# Patient Record
Sex: MALE | Race: WHITE | Employment: OTHER | URBAN - METROPOLITAN AREA
[De-identification: names, ages, dates, MRNs, and addresses within clinical notes are randomized per-mention and may not be internally consistent; named-entity substitution may affect disease eponyms.]

---

## 2017-01-06 ENCOUNTER — HOSPITAL ENCOUNTER (INPATIENT)
Age: 72
LOS: 3 days | Discharge: NURSING, REHAB OR LONG-TERM CARE | DRG: 442 | End: 2017-01-12
Attending: EMERGENCY MEDICINE | Admitting: FAMILY MEDICINE
Payer: MEDICARE

## 2017-01-06 ENCOUNTER — APPOINTMENT (OUTPATIENT)
Dept: GENERAL RADIOLOGY | Age: 72
DRG: 442 | End: 2017-01-06
Attending: FAMILY MEDICINE
Payer: MEDICARE

## 2017-01-06 ENCOUNTER — APPOINTMENT (OUTPATIENT)
Dept: CT IMAGING | Age: 72
DRG: 442 | End: 2017-01-06
Attending: FAMILY MEDICINE
Payer: MEDICARE

## 2017-01-06 ENCOUNTER — APPOINTMENT (OUTPATIENT)
Dept: MRI IMAGING | Age: 72
DRG: 442 | End: 2017-01-06
Attending: INTERNAL MEDICINE
Payer: MEDICARE

## 2017-01-06 DIAGNOSIS — R29.898 RIGHT ARM WEAKNESS: Primary | ICD-10-CM
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
## 2017-01-06 PROBLEM — N18.30 CKD (CHRONIC KIDNEY DISEASE) STAGE 3, GFR 30-59 ML/MIN (HCC): Status: ACTIVE | Noted: 2017-01-06

## 2017-01-06 LAB
ALBUMIN SERPL BCP-MCNC: 3 G/DL (ref 3.5–5)
ALBUMIN/GLOB SERPL: 0.8 {RATIO} (ref 1.1–2.2)
ALP SERPL-CCNC: 83 U/L (ref 45–117)
ALT SERPL-CCNC: 32 U/L (ref 12–78)
AMMONIA PLAS-SCNC: 91 UMOL/L
ANION GAP BLD CALC-SCNC: 7 MMOL/L (ref 5–15)
APPEARANCE UR: CLEAR
AST SERPL W P-5'-P-CCNC: 38 U/L (ref 15–37)
BACTERIA URNS QL MICRO: NEGATIVE /HPF
BASOPHILS # BLD AUTO: 0.1 K/UL (ref 0–0.1)
BASOPHILS # BLD: 1 % (ref 0–1)
BILIRUB SERPL-MCNC: 2.2 MG/DL (ref 0.2–1)
BILIRUB UR QL: NEGATIVE
BUN SERPL-MCNC: 17 MG/DL (ref 6–20)
BUN/CREAT SERPL: 11 (ref 12–20)
CALCIUM SERPL-MCNC: 8.6 MG/DL (ref 8.5–10.1)
CHLORIDE SERPL-SCNC: 108 MMOL/L (ref 97–108)
CO2 SERPL-SCNC: 26 MMOL/L (ref 21–32)
COLOR UR: NORMAL
CREAT SERPL-MCNC: 1.5 MG/DL (ref 0.7–1.3)
EOSINOPHIL # BLD: 0.5 K/UL (ref 0–0.4)
EOSINOPHIL NFR BLD: 9 % (ref 0–7)
EPITH CASTS URNS QL MICRO: NORMAL /LPF
ERYTHROCYTE [DISTWIDTH] IN BLOOD BY AUTOMATED COUNT: 15 % (ref 11.5–14.5)
GLOBULIN SER CALC-MCNC: 4 G/DL (ref 2–4)
GLUCOSE BLD STRIP.AUTO-MCNC: 124 MG/DL (ref 65–100)
GLUCOSE BLD STRIP.AUTO-MCNC: 147 MG/DL (ref 65–100)
GLUCOSE SERPL-MCNC: 167 MG/DL (ref 65–100)
GLUCOSE UR STRIP.AUTO-MCNC: NEGATIVE MG/DL
HCT VFR BLD AUTO: 36.1 % (ref 36.6–50.3)
HGB BLD-MCNC: 12.1 G/DL (ref 12.1–17)
HGB UR QL STRIP: NEGATIVE
HYALINE CASTS URNS QL MICRO: NORMAL /LPF (ref 0–5)
KETONES UR QL STRIP.AUTO: NEGATIVE MG/DL
LEUKOCYTE ESTERASE UR QL STRIP.AUTO: NEGATIVE
LYMPHOCYTES # BLD AUTO: 24 % (ref 12–49)
LYMPHOCYTES # BLD: 1.2 K/UL (ref 0.8–3.5)
MAGNESIUM SERPL-MCNC: 1.8 MG/DL (ref 1.6–2.4)
MCH RBC QN AUTO: 32.3 PG (ref 26–34)
MCHC RBC AUTO-ENTMCNC: 33.5 G/DL (ref 30–36.5)
MCV RBC AUTO: 96.3 FL (ref 80–99)
MONOCYTES # BLD: 0.6 K/UL (ref 0–1)
MONOCYTES NFR BLD AUTO: 12 % (ref 5–13)
NEUTS SEG # BLD: 2.7 K/UL (ref 1.8–8)
NEUTS SEG NFR BLD AUTO: 54 % (ref 32–75)
NITRITE UR QL STRIP.AUTO: NEGATIVE
PH UR STRIP: 6.5 [PH] (ref 5–8)
PLATELET # BLD AUTO: 75 K/UL (ref 150–400)
POTASSIUM SERPL-SCNC: 4.9 MMOL/L (ref 3.5–5.1)
PROT SERPL-MCNC: 7 G/DL (ref 6.4–8.2)
PROT UR STRIP-MCNC: NEGATIVE MG/DL
RBC # BLD AUTO: 3.75 M/UL (ref 4.1–5.7)
RBC #/AREA URNS HPF: NORMAL /HPF (ref 0–5)
SERVICE CMNT-IMP: ABNORMAL
SERVICE CMNT-IMP: ABNORMAL
SODIUM SERPL-SCNC: 141 MMOL/L (ref 136–145)
SP GR UR REFRACTOMETRY: 1.01 (ref 1–1.03)
TROPONIN I SERPL-MCNC: <0.04 NG/ML
UA: UC IF INDICATED,UAUC: NORMAL
UROBILINOGEN UR QL STRIP.AUTO: 1 EU/DL (ref 0.2–1)
WBC # BLD AUTO: 5.1 K/UL (ref 4.1–11.1)
WBC URNS QL MICRO: NORMAL /HPF (ref 0–4)

## 2017-01-06 PROCEDURE — 70551 MRI BRAIN STEM W/O DYE: CPT

## 2017-01-06 PROCEDURE — 77030033269 HC SLV COMPR SCD KNE2 CARD -B

## 2017-01-06 PROCEDURE — 81001 URINALYSIS AUTO W/SCOPE: CPT | Performed by: FAMILY MEDICINE

## 2017-01-06 PROCEDURE — 84484 ASSAY OF TROPONIN QUANT: CPT | Performed by: FAMILY MEDICINE

## 2017-01-06 PROCEDURE — 83735 ASSAY OF MAGNESIUM: CPT | Performed by: FAMILY MEDICINE

## 2017-01-06 PROCEDURE — 99218 HC RM OBSERVATION: CPT

## 2017-01-06 PROCEDURE — 99285 EMERGENCY DEPT VISIT HI MDM: CPT

## 2017-01-06 PROCEDURE — 85025 COMPLETE CBC W/AUTO DIFF WBC: CPT | Performed by: FAMILY MEDICINE

## 2017-01-06 PROCEDURE — 82140 ASSAY OF AMMONIA: CPT | Performed by: FAMILY MEDICINE

## 2017-01-06 PROCEDURE — 80053 COMPREHEN METABOLIC PANEL: CPT | Performed by: FAMILY MEDICINE

## 2017-01-06 PROCEDURE — 71010 XR CHEST PORT: CPT

## 2017-01-06 PROCEDURE — 70450 CT HEAD/BRAIN W/O DYE: CPT

## 2017-01-06 PROCEDURE — 82962 GLUCOSE BLOOD TEST: CPT

## 2017-01-06 PROCEDURE — 36415 COLL VENOUS BLD VENIPUNCTURE: CPT | Performed by: FAMILY MEDICINE

## 2017-01-06 PROCEDURE — 93005 ELECTROCARDIOGRAM TRACING: CPT

## 2017-01-06 RX ORDER — ASPIRIN 81 MG/1
81 TABLET ORAL DAILY
COMMUNITY

## 2017-01-06 RX ORDER — SODIUM CHLORIDE 0.9 % (FLUSH) 0.9 %
5-10 SYRINGE (ML) INJECTION EVERY 8 HOURS
Status: DISCONTINUED | OUTPATIENT
Start: 2017-01-06 | End: 2017-01-12 | Stop reason: HOSPADM

## 2017-01-06 RX ORDER — MAGNESIUM SULFATE 100 %
4 CRYSTALS MISCELLANEOUS AS NEEDED
Status: DISCONTINUED | OUTPATIENT
Start: 2017-01-06 | End: 2017-01-12 | Stop reason: HOSPADM

## 2017-01-06 RX ORDER — GUAIFENESIN 100 MG/5ML
81 LIQUID (ML) ORAL DAILY
Status: DISCONTINUED | OUTPATIENT
Start: 2017-01-07 | End: 2017-01-07

## 2017-01-06 RX ORDER — DEXTROSE 50 % IN WATER (D50W) INTRAVENOUS SYRINGE
12.5-25 AS NEEDED
Status: DISCONTINUED | OUTPATIENT
Start: 2017-01-06 | End: 2017-01-12 | Stop reason: HOSPADM

## 2017-01-06 RX ORDER — INSULIN LISPRO 100 [IU]/ML
INJECTION, SOLUTION INTRAVENOUS; SUBCUTANEOUS
Status: DISCONTINUED | OUTPATIENT
Start: 2017-01-06 | End: 2017-01-12 | Stop reason: HOSPADM

## 2017-01-06 RX ORDER — PROPRANOLOL HYDROCHLORIDE 40 MG/1
40 TABLET ORAL 2 TIMES DAILY
COMMUNITY
End: 2017-01-12

## 2017-01-06 RX ORDER — SODIUM CHLORIDE 0.9 % (FLUSH) 0.9 %
5-10 SYRINGE (ML) INJECTION AS NEEDED
Status: DISCONTINUED | OUTPATIENT
Start: 2017-01-06 | End: 2017-01-12 | Stop reason: HOSPADM

## 2017-01-06 NOTE — ED TRIAGE NOTES
Right sided weakness, poor balance which started last Thursday. Today happened again at 4 PM while trying to pour milk on his cereal, right side became weak, unable to move right side and fell to floor. Has an abrasion on left elbow. Moving right side upon arrival but is weak.

## 2017-01-06 NOTE — IP AVS SNAPSHOT
[address] 
[phone] Patient: Lazarus Bell. MRN: [id number] [facility]:2/72/4371 You are allergic to the following Allergen Reactions Codeine Shortness of Breath Lipitor (Atorvastatin) Other (comments) Stiff neck Lisinopril Myalgia Oxycodone Other (comments) Cant walk cant breathe, need to use my c pap machine Pcn (Penicillins) Shortness of Breath Recent Documentation Height Weight BMI Smoking Status 1.759 m 107 kg 34.6 kg/m2 Never Smoker Emergency Contacts Name Discharge Info Relation Home Work Mobile [address] [facility] CAREGIVER [3] Spouse [3] [phone] Christine Wilson  Father [15] [phone] Deepali Locke  Sister [23] [phone] About your hospitalization You were admitted on:  January 6, 2017 You last received care in the:  [facility] [place] 5M1 MED SURG 1 You were discharged on:  January 12, 2017 Unit phone number:  [phone] Why you were hospitalized 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 Your diagnoses also included:  Vascular Dementia, Thrombocytopenia (Hcc), Benjamin On Cpap, 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), Hx-Tia (Transient Ischemic Attack), Essential Hypertension, Dm Type 2 (Diabetes Mellitus, Type 2) (Hcc), Depression, Ckd (Chronic Kidney Disease) Stage 3, Gfr 30-59 Ml/Min, Increased Ammonia Level, Cerebral Microvascular Disease, Right Sided Weakness Providers Seen During Your Hospitalizations Provider Role Specialty Primary office phone Anthony Brennan MD Attending Provider Emergency Medicine [phone] Kapil Jones MD Attending Provider [facility] [phone] Your Primary Care Physician (PCP) Primary Care Physician Office Phone Office Fax Miranda Ratliff [phone] Follow-up Information Follow up With Details Comments Contact Info Melissa Brito NP Schedule an appointment as soon as possible for a visit in 2 weeks Call for Neurology follow-up appt with NP post hospitalization of R sided weakness within 2-3 weeks-- Speak with Royal Fitzpatrick or Macario Chan and state you are a hospital follow-up. [address] [facility] [id number] 
[phone] Corie Fermin MD   [address] 1007 [facility] 
[phone] 14[address] [facility] [address] [facility] 
[phone] Current Discharge Medication List  
  
START taking these medications Dose & Instructions Dispensing Information Comments Morning Noon Evening Bedtime  
 simvastatin 40 mg tablet Commonly known as:  ZOCOR Your next dose is: Today, Tomorrow Other:  _________ Dose:  40 mg Take 1 Tab by mouth nightly. Quantity:  30 Tab Refills:  0 CONTINUE these medications which have CHANGED Dose & Instructions Dispensing Information Comments Morning Noon Evening Bedtime  
 insulin aspart 100 unit/mL Inpn Commonly known as:  Solitario Mccurdy What changed:  how much to take Your next dose is: Today, Tomorrow Other:  _________ Dose:  5 Units 0.05 mL by SubCUTAneous route Before breakfast, lunch, and dinner. Quantity:  1 Pen Refills:  0 CONTINUE these medications which have NOT CHANGED Dose & Instructions Dispensing Information Comments Morning Noon Evening Bedtime  
 aspirin delayed-release 81 mg tablet Your next dose is: Today, Tomorrow Other:  _________ Dose:  81 mg Take 81 mg by mouth daily. Refills:  0  
     
   
   
   
  
 cyclobenzaprine 10 mg tablet Commonly known as:  FLEXERIL Your next dose is: Today, Tomorrow Other:  _________  Dose:  5-10 mg  
 Take 5-10 mg by mouth three (3) times daily as needed for Muscle Spasm(s). Refills:  0  
     
   
   
   
  
 escitalopram oxalate 20 mg tablet Commonly known as:  Cedric Bowling Your next dose is: Today, Tomorrow Other:  _________ Dose:  20 mg Take 20 mg by mouth nightly. Refills:  0  
     
   
   
   
  
 insulin glargine 100 unit/mL (3 mL) pen Commonly known as:  LANTUS SOLOSTAR Your next dose is: Today, Tomorrow Other:  _________ Dose:  60 Units 60 Units by SubCUTAneous route daily. Quantity:  1 Each Refills:  0  
     
   
   
   
  
 lactulose 10 gram/15 mL solution Commonly known as:  Meera Sanya Your next dose is: Today, Tomorrow Other:  _________ Dose:  20 g Take 20 g by mouth three (3) times daily. Refills:  0  
     
   
   
   
  
 magnesium oxide 400 mg tablet Commonly known as:  MAG-OX Your next dose is: Today, Tomorrow Other:  _________ Dose:  400 mg Take 1 Tab by mouth two (2) times a day. Quantity:  1 Tab Refills:  0  
     
   
   
   
  
 omeprazole 40 mg capsule Commonly known as:  PRILOSEC Your next dose is: Today, Tomorrow Other:  _________ Dose:  40 mg Take 40 mg by mouth daily. Refills:  0  
     
   
   
   
  
 rifAXIMin 550 mg tablet Commonly known as:  Gwendolyn Magic Your next dose is: Today, Tomorrow Other:  _________ Dose:  550 mg Take 1 Tab by mouth two (2) times a day. Quantity:  1 Tab Refills:  0  
     
   
   
   
  
 spironolactone 25 mg tablet Commonly known as:  ALDACTONE Your next dose is: Today, Tomorrow Other:  _________ Dose:  25 mg Take 25 mg by mouth daily. Refills:  0 VENTOLIN HFA 90 mcg/actuation inhaler Generic drug:  albuterol Your next dose is: Today, Tomorrow Other:  _________ Dose:  1 Puff Take 1 Puff by inhalation every four (4) hours as needed for Wheezing or Shortness of Breath. Refills:  0 STOP taking these medications   
 propranolol 40 mg tablet Commonly known as:  INDERAL Where to Get Your Medications Information on where to get these meds will be given to you by the nurse or doctor. ! Ask your nurse or doctor about these medications  
  insulin aspart 100 unit/mL Inpn  
 insulin glargine 100 unit/mL (3 mL) pen  
 simvastatin 40 mg tablet Discharge Instructions Patient Discharge Instructions Lilia Harely / [id number] : 1945 Admitted 2017 Discharged: 2017 12:34 PM  
 
ACUTE DIAGNOSES: 
right side weakness Right sided weakness CHRONIC MEDICAL DIAGNOSES: 
Problem List as of 2017  Date Reviewed: 2017 Codes Class Noted - Resolved Increased ammonia level ICD-10-CM: R79.89 ICD-9-CM: 790.6  2017 - Present Cerebral microvascular disease ICD-10-CM: I67.9 ICD-9-CM: 437.9  2017 - Present Right sided weakness ICD-10-CM: M62.81 ICD-9-CM: 728.87  2017 - Present * (Principal)Hemispheric carotid artery syndrome ICD-10-CM: G45.1 ICD-9-CM: 435.8  2017 - Present CKD (chronic kidney disease) stage 3, GFR 30-59 ml/min ICD-10-CM: N18.3 ICD-9-CM: 585.3  2017 - Present ARF (acute renal failure) (HCC) ICD-10-CM: N17.9 ICD-9-CM: 584.9  2016 - Present Dizziness ICD-10-CM: E91 ICD-9-CM: 780.4  2016 - Present LETY on CPAP (Chronic) ICD-10-CM: G47.33 
ICD-9-CM: 327.23  2016 - Present DM type 2 (diabetes mellitus, type 2) (HCC) (Chronic) ICD-10-CM: E11.9 ICD-9-CM: 250.00  2016 - Present Vascular dementia (Chronic) ICD-10-CM: F01.50 ICD-9-CM: 290.40  2016 - Present Hx-TIA (transient ischemic attack) (Chronic) ICD-10-CM: D97.15 
ICD-9-CM: V12.54  2016 - Present Hyponatremia ICD-10-CM: E87.1 ICD-9-CM: 276.1  1/16/2016 - Present Essential hypertension (Chronic) ICD-10-CM: I10 
ICD-9-CM: 401.9  12/12/2013 - Present 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 (HCC) (Chronic) ICD-10-CM: K75.81, K74.60 ICD-9-CM: 571.8, 571.5  12/12/2013 - Present Thrombocytopenia (HCC) (Chronic) ICD-10-CM: D69.6 ICD-9-CM: 287.5  12/12/2013 - Present Depression (Chronic) ICD-10-CM: F32.9 ICD-9-CM: 278  12/12/2013 - Present RESOLVED: Acute hepatic encephalopathy (HCC) ICD-10-CM: K72.00 ICD-9-CM: 572.2  1/16/2016 - 4/8/2016 RESOLVED: Dehydration, moderate ICD-10-CM: E86.0 ICD-9-CM: 276.51  1/16/2016 - 4/8/2016 RESOLVED: Lactic acidosis ICD-10-CM: E87.2 ICD-9-CM: 276.2  1/16/2016 - 4/8/2016 RESOLVED: Metabolic encephalopathy ACT-63-AL: G93.41 
ICD-9-CM: 348.31  8/19/2014 - 4/8/2016 RESOLVED: TIA (transient ischemic attack) ICD-10-CM: G45.9 ICD-9-CM: 435.9  12/12/2013 - 4/8/2016 RESOLVED: Slurred speech ICD-10-CM: R47.81 ICD-9-CM: 784.59  12/12/2013 - 4/8/2016 RESOLVED: Blurry vision, left eye ICD-10-CM: H53.8 ICD-9-CM: 368.8  12/12/2013 - 4/8/2016 DISCHARGE MEDICATIONS:  
 
 
 
· It is important that you take the medication exactly as they are prescribed. · Keep your medication in the bottles provided by the pharmacist and keep a list of the medication names, dosages, and times to be taken in your wallet. · Do not take other medications without consulting your doctor. DIET:  Diabetic Diet ACTIVITY: Activity as tolerated ADDITIONAL INFORMATION: If you experience any of the following symptoms then please call your primary care physician or return to the emergency room if you cannot get hold of your doctor: Fever, chills, nausea, vomiting, diarrhea, change in mentation, falling, bleeding, shortness of breath.  
 
FOLLOW UP CARE: 
 Dr. Adam Salguero MD  you are to call and set up an appointment to see them with in 1 week. Follow-up with specialists at directed by them Information obtained by : 
I understand that if any problems occur once I am at home I am to contact my physician. I understand and acknowledge receipt of the instructions indicated above. Physician's or R.N.'s Signature                                                                  Date/Time Patient or Representative Signature                                                          Date/Time Discharge Orders None [employer] Announcement We are excited to announce that we are making your provider's discharge notes available to you in [employer]. You will see these notes when they are completed and signed by the physician that discharged you from your recent hospital stay. If you have any questions or concerns about any information you see in [employer], please call the Health Information Department where you were seen or reach out to your Primary Care Provider for more information about your plan of care. Introducing [facility] SERVICES! Dear Juanjo Babcock: Thank you for requesting a [employer] account. Our records indicate that you already have an active [employer] account. You can access your account anytime at [email or web address] Did you know that you can access your hospital and ER discharge instructions at any time in [employer]? You can also review all of your test results from your hospital stay or ER visit. Additional Information If you have questions, please visit the Frequently Asked Questions section of the [employer] website at [email or web address] [employer]/. Remember, MyChart is NOT to be used for urgent needs. For medical emergencies, dial 911. Now available from your iPhone and Android! General Information Please provide this summary of care documentation to your next provider. Patient Signature:  ____________________________________________________________ Date:  ____________________________________________________________  
  
Deana Camden Provider Signature:  ____________________________________________________________ Date:  ____________________________________________________________

## 2017-01-06 NOTE — ED NOTES
Attempted to use urinal but urine ended up in his attends. Attends removed and blue pad placed under him.

## 2017-01-06 NOTE — ED PROVIDER NOTES
HPI Comments: 71yo male with hx of cirrhosis, HTN, diabetes who was brought in by EMS after a GLF. He reports that he was standing in his kitchen and his legs went weak and he fell. He hit his left elbow when he fell but denies head injury. 1 week history of generalized weakness but noted more weakness in his right leg. Also reported difficulty using his right arm this AM.  Currently, he denies weakness in his upper extremities. No facial droop. No lightheadedness, dizziness or syncope. No recent illness, fever, chills, sweats. No chest pain, palpitations or difficulty breathing. Currently taking Lactulose for cirrhosis so he has diarrhea at baseline. Pt not accompanied by any family so baseline mentation is unclear. Poor historian with slow response to questions. Lives at home with his wife. The history is provided by the patient. Past Medical History:   Diagnosis Date    Anxiety     Arthritis     Depression     Diabetes mellitus ([facility] [address]     Hypertension     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 (HCC)     Muscle pain     PUD (peptic ulcer disease)     Sleep apnea     Snoring     Visual disturbance        Past Surgical History:   Procedure Laterality Date    Hx heent      Hx tonsillectomy      Hx orthopaedic       carly tendon repair    Hx appendectomy      Hx hernia repair      Hx hernia repair           Family History:   Problem Relation Age of Onset    Hypertension Other     Diabetes Other        Social History     Social History    Marital status:      Spouse name: N/A    Number of children: N/A    Years of education: N/A     Occupational History    Not on file. Social History Main Topics    Smoking status: Never Smoker    Smokeless tobacco: Never Used    Alcohol use No    Drug use: No    Sexual activity: Not on file     Other Topics Concern    Not on file     Social History Narrative         ALLERGIES: Codeine; Lipitor [atorvastatin];  Lisinopril; Oxycodone; and Pcn [penicillins]    Review of Systems   Constitutional: Negative for chills, diaphoresis and fever. HENT: Negative for congestion and sore throat. Respiratory: Negative for cough, chest tightness and shortness of breath. Cardiovascular: Negative for chest pain, palpitations and leg swelling. Gastrointestinal: Positive for diarrhea. Negative for abdominal pain, blood in stool, nausea and vomiting. Genitourinary: Negative for dysuria, hematuria and urgency. Skin: Positive for wound. Left elbow abrasion    Neurological: Positive for weakness. Negative for dizziness, syncope, facial asymmetry, light-headedness and numbness. Vitals:    01/06/17 1648   BP: 169/83   Pulse: (!) 46   Resp: 20   Temp: 98.2 °F (36.8 °C)   SpO2: 100%   Weight: 107 kg (236 lb)   Height: 5' 9.25\" (1.759 m)            Physical Exam   Constitutional: He is oriented to person, place, and time. He appears well-developed. No distress. HENT:   Head: Normocephalic. Mouth/Throat: Oropharynx is clear and moist.   Eyes: Conjunctivae and EOM are normal. Pupils are equal, round, and reactive to light. Cardiovascular: Regular rhythm. Bradycardia present. Pulses:       Posterior tibial pulses are 1+ on the right side, and 1+ on the left side. Pulmonary/Chest: Effort normal. No respiratory distress. He has decreased breath sounds in the right lower field and the left lower field. He has no wheezes. He has no rhonchi. He has no rales. Abdominal: Soft. Bowel sounds are normal. There is no guarding. Musculoskeletal: He exhibits no edema or tenderness. Neurological: He is alert and oriented to person, place, and time. He has normal strength. No cranial nerve deficit. He exhibits normal muscle tone. No focal neurologic deficits  5/5 , upper extremity and lower extremity strength bilaterally. Skin: Skin is warm and dry. He is not diaphoretic.    +Left elbow superficial abrasion         MDM  Number of Diagnoses or Management Options  Diagnosis management comments: Generalized weakness vs CVA vs symptomatic bradycardia. Will check labs, CXR and head CT and reassess patient. Amount and/or Complexity of Data Reviewed  Clinical lab tests: ordered  Tests in the radiology section of CPT®: ordered  Tests in the medicine section of CPT®: ordered  Independent visualization of images, tracings, or specimens: yes      ED Course       Procedures    5:22 PM  EKG:  Sinus bradycardia with HR 45. No ST segment elevations or depression. Borderline QTc at 442.     6:49 PM  Called and spoke with patient's wife per patient request.  Discussed with her that he would be admitted to the hospital for further work up. She reports that he has been falling more often, and she recently had broken her leg trying to help him up. She is worried that he needs more care than she is able to handle. Questions answered. Encouraged to call back for updates. 9:25 PM  Late entry. Pt discussed with hospitalist, Dr. Xavier Thomas, who will admit. Plan discussed with ED attending, Dr. Jazmin Martin.      Ricco Davis MD

## 2017-01-06 NOTE — IP AVS SNAPSHOT
Current Discharge Medication List  
  
Take these medications at their scheduled times Dose & Instructions Dispensing Information Comments Morning Noon Evening Bedtime  
 aspirin delayed-release 81 mg tablet Your next dose is: Today, Tomorrow Other:  ____________ Dose:  81 mg Take 81 mg by mouth daily. Refills:  0  
     
   
   
   
  
 escitalopram oxalate 20 mg tablet Commonly known as:  Félix Bird Your next dose is: Today, Tomorrow Other:  ____________ Dose:  20 mg Take 20 mg by mouth nightly. Refills:  0  
     
   
   
   
  
 insulin aspart 100 unit/mL Inpn Commonly known as:  Yaron Hilt Your next dose is: Today, Tomorrow Other:  ____________ Dose:  5 Units 0.05 mL by SubCUTAneous route Before breakfast, lunch, and dinner. Quantity:  1 Pen Refills:  0  
     
   
   
   
  
 insulin glargine 100 unit/mL (3 mL) pen Commonly known as:  LANTUS SOLOSTAR Your next dose is: Today, Tomorrow Other:  ____________ Dose:  60 Units 60 Units by SubCUTAneous route daily. Quantity:  1 Each Refills:  0  
     
   
   
   
  
 lactulose 10 gram/15 mL solution Commonly known as:  Jackolyn Cool Your next dose is: Today, Tomorrow Other:  ____________ Dose:  20 g Take 20 g by mouth three (3) times daily. Refills:  0  
     
   
   
   
  
 magnesium oxide 400 mg tablet Commonly known as:  MAG-OX Your next dose is: Today, Tomorrow Other:  ____________ Dose:  400 mg Take 1 Tab by mouth two (2) times a day. Quantity:  1 Tab Refills:  0  
     
   
   
   
  
 omeprazole 40 mg capsule Commonly known as:  PRILOSEC Your next dose is: Today, Tomorrow Other:  ____________ Dose:  40 mg Take 40 mg by mouth daily. Refills:  0  
     
   
   
   
  
 rifAXIMin 550 mg tablet Commonly known as:  Willetta Em  
   
 Your next dose is: Today, Tomorrow Other:  ____________ Dose:  550 mg Take 1 Tab by mouth two (2) times a day. Quantity:  1 Tab Refills:  0  
     
   
   
   
  
 simvastatin 40 mg tablet Commonly known as:  ZOCOR Your next dose is: Today, Tomorrow Other:  ____________ Dose:  40 mg Take 1 Tab by mouth nightly. Quantity:  30 Tab Refills:  0  
     
   
   
   
  
 spironolactone 25 mg tablet Commonly known as:  ALDACTONE Your next dose is: Today, Tomorrow Other:  ____________ Dose:  25 mg Take 25 mg by mouth daily. Refills:  0 Take these medications as needed Dose & Instructions Dispensing Information Comments Morning Noon Evening Bedtime  
 cyclobenzaprine 10 mg tablet Commonly known as:  FLEXERIL Your next dose is: Today, Tomorrow Other:  ____________ Dose:  5-10 mg Take 5-10 mg by mouth three (3) times daily as needed for Muscle Spasm(s). Refills:  0 VENTOLIN HFA 90 mcg/actuation inhaler Generic drug:  albuterol Your next dose is: Today, Tomorrow Other:  ____________ Dose:  1 Puff Take 1 Puff by inhalation every four (4) hours as needed for Wheezing or Shortness of Breath. Refills:  0 Where to Get Your Medications Information about where to get these medications is not yet available ! Ask your nurse or doctor about these medications  
  insulin aspart 100 unit/mL Inpn  
 insulin glargine 100 unit/mL (3 mL) pen  
 simvastatin 40 mg tablet

## 2017-01-07 PROBLEM — G45.1 HEMISPHERIC CAROTID ARTERY SYNDROME: Status: ACTIVE | Noted: 2017-01-07

## 2017-01-07 LAB
CHOLEST SERPL-MCNC: 136 MG/DL
EST. AVERAGE GLUCOSE BLD GHB EST-MCNC: 177 MG/DL
GLUCOSE BLD STRIP.AUTO-MCNC: 109 MG/DL (ref 65–100)
GLUCOSE BLD STRIP.AUTO-MCNC: 120 MG/DL (ref 65–100)
GLUCOSE BLD STRIP.AUTO-MCNC: 157 MG/DL (ref 65–100)
GLUCOSE BLD STRIP.AUTO-MCNC: 158 MG/DL (ref 65–100)
HBA1C MFR BLD: 7.8 % (ref 4.2–6.3)
HDLC SERPL-MCNC: 59 MG/DL
HDLC SERPL: 2.3 {RATIO} (ref 0–5)
LDLC SERPL CALC-MCNC: 60.4 MG/DL (ref 0–100)
LIPID PROFILE,FLP: NORMAL
SERVICE CMNT-IMP: ABNORMAL
TRIGL SERPL-MCNC: 83 MG/DL (ref ?–150)
VLDLC SERPL CALC-MCNC: 16.6 MG/DL

## 2017-01-07 PROCEDURE — 97112 NEUROMUSCULAR REEDUCATION: CPT | Performed by: OCCUPATIONAL THERAPIST

## 2017-01-07 PROCEDURE — 97535 SELF CARE MNGMENT TRAINING: CPT | Performed by: OCCUPATIONAL THERAPIST

## 2017-01-07 PROCEDURE — 94660 CPAP INITIATION&MGMT: CPT

## 2017-01-07 PROCEDURE — 97530 THERAPEUTIC ACTIVITIES: CPT

## 2017-01-07 PROCEDURE — 82962 GLUCOSE BLOOD TEST: CPT

## 2017-01-07 PROCEDURE — 74011636637 HC RX REV CODE- 636/637: Performed by: INTERNAL MEDICINE

## 2017-01-07 PROCEDURE — 80061 LIPID PANEL: CPT | Performed by: INTERNAL MEDICINE

## 2017-01-07 PROCEDURE — G8988 SELF CARE GOAL STATUS: HCPCS | Performed by: OCCUPATIONAL THERAPIST

## 2017-01-07 PROCEDURE — 74011250637 HC RX REV CODE- 250/637: Performed by: INTERNAL MEDICINE

## 2017-01-07 PROCEDURE — 97162 PT EVAL MOD COMPLEX 30 MIN: CPT

## 2017-01-07 PROCEDURE — 36415 COLL VENOUS BLD VENIPUNCTURE: CPT | Performed by: INTERNAL MEDICINE

## 2017-01-07 PROCEDURE — 97116 GAIT TRAINING THERAPY: CPT

## 2017-01-07 PROCEDURE — A9270 NON-COVERED ITEM OR SERVICE: HCPCS | Performed by: INTERNAL MEDICINE

## 2017-01-07 PROCEDURE — 97165 OT EVAL LOW COMPLEX 30 MIN: CPT | Performed by: OCCUPATIONAL THERAPIST

## 2017-01-07 PROCEDURE — 99218 HC RM OBSERVATION: CPT

## 2017-01-07 PROCEDURE — G8987 SELF CARE CURRENT STATUS: HCPCS | Performed by: OCCUPATIONAL THERAPIST

## 2017-01-07 PROCEDURE — 93880 EXTRACRANIAL BILAT STUDY: CPT

## 2017-01-07 PROCEDURE — 83036 HEMOGLOBIN GLYCOSYLATED A1C: CPT | Performed by: INTERNAL MEDICINE

## 2017-01-07 RX ORDER — ASPIRIN 81 MG/1
81 TABLET ORAL DAILY
Status: DISCONTINUED | OUTPATIENT
Start: 2017-01-07 | End: 2017-01-12 | Stop reason: HOSPADM

## 2017-01-07 RX ORDER — PANTOPRAZOLE SODIUM 40 MG/1
40 TABLET, DELAYED RELEASE ORAL
Status: DISCONTINUED | OUTPATIENT
Start: 2017-01-07 | End: 2017-01-12 | Stop reason: HOSPADM

## 2017-01-07 RX ORDER — CYCLOBENZAPRINE HCL 10 MG
5-10 TABLET ORAL
Status: DISCONTINUED | OUTPATIENT
Start: 2017-01-07 | End: 2017-01-07

## 2017-01-07 RX ORDER — CYCLOBENZAPRINE HCL 10 MG
5 TABLET ORAL
Status: DISCONTINUED | OUTPATIENT
Start: 2017-01-07 | End: 2017-01-12 | Stop reason: HOSPADM

## 2017-01-07 RX ORDER — INSULIN LISPRO 100 [IU]/ML
20 INJECTION, SOLUTION INTRAVENOUS; SUBCUTANEOUS
Status: DISCONTINUED | OUTPATIENT
Start: 2017-01-07 | End: 2017-01-12 | Stop reason: HOSPADM

## 2017-01-07 RX ORDER — ESCITALOPRAM OXALATE 10 MG/1
20 TABLET ORAL
Status: DISCONTINUED | OUTPATIENT
Start: 2017-01-07 | End: 2017-01-12 | Stop reason: HOSPADM

## 2017-01-07 RX ORDER — SPIRONOLACTONE 25 MG/1
25 TABLET ORAL DAILY
Status: DISCONTINUED | OUTPATIENT
Start: 2017-01-07 | End: 2017-01-12 | Stop reason: HOSPADM

## 2017-01-07 RX ORDER — INSULIN GLARGINE 100 [IU]/ML
60 INJECTION, SOLUTION SUBCUTANEOUS DAILY
Status: DISCONTINUED | OUTPATIENT
Start: 2017-01-07 | End: 2017-01-12 | Stop reason: HOSPADM

## 2017-01-07 RX ORDER — PROPRANOLOL HYDROCHLORIDE 10 MG/1
40 TABLET ORAL 2 TIMES DAILY
Status: DISCONTINUED | OUTPATIENT
Start: 2017-01-07 | End: 2017-01-08

## 2017-01-07 RX ADMIN — INSULIN LISPRO 2 UNITS: 100 INJECTION, SOLUTION INTRAVENOUS; SUBCUTANEOUS at 16:39

## 2017-01-07 RX ADMIN — ASPIRIN 81 MG: 81 TABLET, COATED ORAL at 08:48

## 2017-01-07 RX ADMIN — PROPRANOLOL HYDROCHLORIDE 40 MG: 10 TABLET ORAL at 21:36

## 2017-01-07 RX ADMIN — Medication 10 ML: at 21:36

## 2017-01-07 RX ADMIN — SPIRONOLACTONE 25 MG: 25 TABLET ORAL at 08:48

## 2017-01-07 RX ADMIN — LACTULOSE 20 G: 10 SOLUTION ORAL at 21:36

## 2017-01-07 RX ADMIN — PANTOPRAZOLE SODIUM 40 MG: 40 TABLET, DELAYED RELEASE ORAL at 08:48

## 2017-01-07 RX ADMIN — RIFAXIMIN 550 MG: 550 TABLET ORAL at 10:05

## 2017-01-07 RX ADMIN — LACTULOSE 20 G: 10 SOLUTION ORAL at 16:38

## 2017-01-07 RX ADMIN — PROPRANOLOL HYDROCHLORIDE 40 MG: 10 TABLET ORAL at 08:48

## 2017-01-07 RX ADMIN — INSULIN LISPRO 20 UNITS: 100 INJECTION, SOLUTION INTRAVENOUS; SUBCUTANEOUS at 12:39

## 2017-01-07 RX ADMIN — LACTULOSE 20 G: 10 SOLUTION ORAL at 08:47

## 2017-01-07 RX ADMIN — INSULIN LISPRO 20 UNITS: 100 INJECTION, SOLUTION INTRAVENOUS; SUBCUTANEOUS at 16:39

## 2017-01-07 RX ADMIN — INSULIN GLARGINE 60 UNITS: 100 INJECTION, SOLUTION SUBCUTANEOUS at 08:47

## 2017-01-07 RX ADMIN — RIFAXIMIN 550 MG: 550 TABLET ORAL at 21:36

## 2017-01-07 RX ADMIN — ESCITALOPRAM OXALATE 20 MG: 10 TABLET ORAL at 21:36

## 2017-01-07 RX ADMIN — INSULIN LISPRO 20 UNITS: 100 INJECTION, SOLUTION INTRAVENOUS; SUBCUTANEOUS at 08:47

## 2017-01-07 NOTE — PROGRESS NOTES
11:55 PM Patient states he uses CPAP at home, states his order is for 18 peep, Called Dr. Lisa Slade MD states to order CPAP per home setting. Will continue to monitor patient.

## 2017-01-07 NOTE — PROGRESS NOTES
Problem: Mobility Impaired (Adult and Pediatric)  Goal: *Acute Goals and Plan of Care (Insert Text)  Physical Therapy Goals  Initiated 1/7/2017  1. Patient will move from supine to sit and sit to supine in bed with independence within 7 day(s). 2. Patient will transfer from bed to chair and chair to bed with independence using the least restrictive device within 7 day(s). 3. Patient will perform sit to stand with independence within 7 day(s). 4. Patient will ambulate with modified independence for 150 feet with the least restrictive device within 7 day(s). PHYSICAL THERAPY EVALUATION  Patient: Uma Xiong (77 y.o. male)  Date: 1/7/2017  Primary Diagnosis: right side weakness        Precautions:   Fall      ASSESSMENT :  Based on the objective data described below, the patient presents with h/o deprsesion, dm, htn, cirrhosis who presents with weakness. He was trying to eat cereal when he noted right hand weakness. He was unable to pour his milk. Imaging negative. He had TIA in the past.  With interviewing patient he indicates that his weakness has been getting worse \"at least a week. \"  His wife is currently in a walking shoe. With strength testing noted right knee weakness, bilateral ankles WNL. Slow to follow command, but later perks up and telling jokes. All transfers taking extra time and a minimum of supervision. Performed the Lou balance assessment - he is at moderate risk for falls. Performed transfer training and gait training in saul with rolling walker. Up in chair with chair alarm. Reviewed use of call button, later while sitting up, he used the wall (code blue) button to call. Patient will benefit from skilled intervention to address the above impairments.   Patients rehabilitation potential is considered to be Good  Factors which may influence rehabilitation potential include:   [ ]         None noted  [ ]         Mental ability/status  [X]         Medical condition  [X] Home/family situation and support systems  [X]         Safety awareness  [ ]         Pain tolerance/management  [ ]         Other:        PLAN :  Recommendations and Planned Interventions:  [X]           Bed Mobility Training             [ ]    Neuromuscular Re-Education  [X]           Transfer Training                   [ ]    Orthotic/Prosthetic Training  [X]           Gait Training                         [ ]    Modalities  [X]           Therapeutic Exercises           [ ]    Edema Management/Control  [X]           Therapeutic Activities            [X]    Patient and Family Training/Education  [ ]           Other (comment):     Frequency/Duration: Patient will be followed by physical therapy  daily to address goals. Discharge Recommendations: Rehab  Further Equipment Recommendations for Discharge: tbd       SUBJECTIVE:   Patient stated a day you don't smile is a day not worth living.       OBJECTIVE DATA SUMMARY:   HISTORY:    Past Medical History   Diagnosis Date    Anxiety      Arthritis      Depression      Diabetes mellitus ([facility] [address]      Hypertension      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 (HCC)      Muscle pain      PUD (peptic ulcer disease)      Sleep apnea      Snoring      Visual disturbance       Past Surgical History   Procedure Laterality Date    Hx heent        Hx tonsillectomy        Hx orthopaedic           carly tendon repair    Hx appendectomy        Hx hernia repair        Hx hernia repair         Prior Level of Function/Home Situation: lives with wife in a one story home, uses a rolling walker in the home. Stef Winslow prior to admit.   Personal factors and/or comorbidities impacting plan of care:      Home Situation  Home Environment: Apartment  One/Two Story Residence: One story  Living Alone: No  Support Systems: Spouse/Significant Other/Partner  Patient Expects to be Discharged to[de-identified] Rehabilitation facility  Current DME Used/Available at Home: Braden Canavan, rollator, Braden Canavan, rolling, Grab bars, Shower chair, Dietra Hives, quad  Tub or Shower Type: Tub/Shower combination     EXAMINATION/PRESENTATION/DECISION MAKING:   Critical Behavior:  Neurologic State: Alert  Orientation Level: Oriented to person, Oriented to place, Oriented to situation, Oriented to time  Cognition: Follows commands, Appropriate for age attention/concentration (harder time getting words out, blamed on CPap)        Strength:    Strength:  (R hip flex 4/5, knee flex/ext 3+/5, DF/PF 5/5, left LE 5/5)                    Tone & Sensation:   Tone: Normal              Sensation: Intact               Range Of Motion:  AROM: Within functional limits           PROM: Within functional limits           Coordination:  Coordination: Generally decreased, functional (however left < right)     Functional Mobility:  Bed Mobility:     Supine to Sit: Contact guard assistance; Adaptive equipment; Additional time  Sit to Supine: Contact guard assistance;Assist x1  Scooting: Minimum assistance; Additional time;Assist x1  Transfers:  Sit to Stand: Stand-by asssistance; Adaptive equipment; Additional time  Stand to Sit: Stand-by asssistance; Additional time        Bed to Chair: Contact guard assistance;Assist x1;Additional time              Balance:   Sitting: Impaired  Sitting - Static: Good (unsupported); Unsupported  Sitting - Dynamic: Fair (occasional)  Standing: Impaired; With support  Standing - Static: Good  Standing - Dynamic : Fair  Ambulation/Gait Training:                                                                               Stairs:                                Functional Measure:  Lou Balance Test:      Sitting to Standin  Standing Unsupported: 4  Sitting with Back Unsupported: 4  Standing to Sittin  Transfers: 3  Standing Unsupported with Eyes Closed: 3  Standing Unsupported with Feet Together: 4  Reach Forward with Outstretched Arm: 2   Object: 3  Turn to Look Over Shoulders: 1  Turn 360 Degrees: 0  Alternate Foot on Step/Stool: 0  Standing Unsupported One Foot in Front: 0  Stand on One Le  Total: 27             56=Maximum possible score;   0-20=High fall risk  21-40=Moderate fall risk   41-56=Low fall risk      Lou Balance Test and G-code impairment scale:  Percentage of Impairment CH     0%    CI     1-19% CJ     20-39% CK     40-59% CL     60-79% CM     80-99% CN      100%   Lou   Score 0-56 56 45-55 34-44 23-33 12-22 1-11 0            G codes: In compliance with CMSs Claims Based Outcome Reporting, the following G-code set was chosen for this patient based on their primary functional limitation being treated: The outcome measure chosen to determine the severity of the functional limitation was the Leger with a score of 27/56 which was correlated with the impairment scale. · Mobility - Walking and Moving Around:               - CURRENT STATUS:    CK - 40%-59% impaired, limited or restricted               - GOAL STATUS:           CJ - 20%-39% impaired, limited or restricted               - D/C STATUS:                       ---------------To be determined---------------      Physical Therapy Evaluation Charge Determination   History Examination Presentation Decision-Making   MEDIUM  Complexity : 1-2 comorbidities / personal factors will impact the outcome/ POC  MEDIUM Complexity : 3 Standardized tests and measures addressing body structure, function, activity limitation and / or participation in recreation  MEDIUM Complexity : Evolving with changing characteristics  MEDIUM Complexity : FOTO score of 26-74      Based on the above components, the patient evaluation is determined to be of the following complexity level: MEDIUM     Pain:  Pain Scale 1: Numeric (0 - 10)  Pain Intensity 1: 0              Activity Tolerance:   No complaints of dizziness or nausea  Please refer to the flowsheet for vital signs taken during this treatment.   After treatment:   [X]         Patient left in no apparent distress sitting up in chair  [ ]         Patient left in no apparent distress in bed  [X]         Call bell left within reach  [X]         Nursing notified  [ ]         Caregiver present  [X]         Chair alarm activated      COMMUNICATION/EDUCATION:   The patients plan of care was discussed with: Registered Nurse.  [X]         Fall prevention education was provided and the patient/caregiver indicated understanding. [X]         Patient/family have participated as able in goal setting and plan of care. [X]         Patient/family agree to work toward stated goals and plan of care. [ ]         Patient understands intent and goals of therapy, but is neutral about his/her participation. [ ]         Patient is unable to participate in goal setting and plan of care.      Thank you for this referral.  Vargas Wright, PT   Time Calculation: 31 mins

## 2017-01-07 NOTE — PROGRESS NOTES
Stroke Education provided to patient and the following topics were discussed    1. Patients personal risk factors for stroke are hypertension, diabetes mellitus, obesity and sleep apnea screen    2. Warning signs of Stroke:        * Sudden numbness or weakness of the face, arm or leg, especially on one side of          The body            * Sudden confusion, trouble speaking or understanding        * Sudden trouble seeing in one or both eyes        * Sudden trouble walking, dizziness, loss of balance or coordination        * Sudden severe headache with no known cause      3. Importance of activation Emergency Medical Services ( 9-1-1 ) immediately if experience any warning signs of stroke. 4. Be sure and schedule a follow-up appointment with your primary care doctor or any specialists as instructed. 5. You must take medicine every day to treat your risk factors for stroke. Be sure to take your medicines exactly as your doctor tells you: no more, no less. Know what your medicines are for , what they do. Anti-thrombotics /anticoagulants can help prevent strokes. You are taking the following medicine(s)  aspirin     6. Smoking and second-hand smoke greatly increase your risk of stroke, cardiovascular disease and death. Smoking history never    7. Information provided was [facility] Stroke Education Angelica Mcgill or Verbal Education    8. Documentation of teaching completed in Patient Education Activity and on Care Plan with teaching response noted?   yes

## 2017-01-07 NOTE — PROGRESS NOTES
Daily Progress Note: 1/7/2017  Nini Pop. Frederick Olivarez MD    Assessment/Plan:   1. Right side weakness / Hx-TIA (transient ischemic attack): weakness has now resolved. ---Stroke echo, carotids done 2013.   --- MRI. normal   ---Start asa.   ---Send A1c and lipid panel.  ---Neuro Consult  ---PT/OT consult  ---Repeat carotid dopplers     2.  Essential hypertension:   ---cont BB,   ---BP meds stopped last hospital stay due to hypotension     3. 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: stable. ---Resume xifaxan, aldactone, lactulose, propranolol  ---Ammonia 91   ---Monitor ammonia     4. Thrombocytopenia: stable, 2/2 liver disease. ---monitor     5. Depression:   ---resume lexapro     6. DM type 2 (diabetes mellitus, type 2): with renal complications.   ---Resume insulin with SSI  ---Check A1c     7.  Vascular dementia: stable     8.  CKD (chronic kidney disease) stage 3: unclear baseline but does not appear to be worse. ---monitor    9. Disp- he lives with his wife who is not in good health. Has had multiple falls over the last few weeks. They have a medical alert and this has been activated numerous times.  Will ask case management for assistance.     DNR/DNI based on medical record review       Problem List:  Problem List as of 1/7/2017  Date Reviewed: 1/6/2017          Codes Class Noted - Resolved    CKD (chronic kidney disease) stage 3, GFR 30-59 ml/min ICD-10-CM: N18.3  ICD-9-CM: 585.3  1/6/2017 - Present        ARF (acute renal failure) ([facility] [address] ICD-10-CM: N17.9  ICD-9-CM: 584.9  4/8/2016 - Present        Dizziness ICD-10-CM: R42  ICD-9-CM: 780.4  4/8/2016 - Present        LTEY on CPAP (Chronic) ICD-10-CM: G47.33  ICD-9-CM: 327.23  4/8/2016 - Present        DM type 2 (diabetes mellitus, type 2) (HCC) (Chronic) ICD-10-CM: E11.9  ICD-9-CM: 250.00  1/16/2016 - Present        Vascular dementia (Chronic) ICD-10-CM: F01.50  ICD-9-CM: 290.40  1/16/2016 - Present        Hx-TIA (transient ischemic attack) (Chronic) ICD-10-CM: Z86.73  ICD-9-CM: V12.54  1/16/2016 - Present        Hyponatremia ICD-10-CM: E87.1  ICD-9-CM: 276.1  1/16/2016 - Present        Essential hypertension (Chronic) ICD-10-CM: I10  ICD-9-CM: 401.9  12/12/2013 - Present        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 [facility]) (Chronic) ICD-10-CM: K75.81, K74.60  ICD-9-CM: 571.8, 571.5  12/12/2013 - Present        Thrombocytopenia (HCC) (Chronic) ICD-10-CM: D69.6  ICD-9-CM: 287.5  12/12/2013 - Present        Depression (Chronic) ICD-10-CM: F32.9  ICD-9-CM: 108  12/12/2013 - Present        RESOLVED: Acute hepatic encephalopathy ([address] ICD-10-CM: K72.00  ICD-9-CM: 572.2  1/16/2016 - 4/8/2016        RESOLVED: Dehydration, moderate ICD-10-CM: E86.0  ICD-9-CM: 276.51  1/16/2016 - 4/8/2016        RESOLVED: Lactic acidosis ICD-10-CM: S74.0  ICD-9-CM: 276.2  1/16/2016 - 4/8/2016        RESOLVED: Metabolic encephalopathy VIC-63-CLARISSE: G93.41  ICD-9-CM: 348.31  8/19/2014 - 4/8/2016        RESOLVED: TIA (transient ischemic attack) ICD-10-CM: G45.9  ICD-9-CM: 435.9  12/12/2013 - 4/8/2016        RESOLVED: Slurred speech ICD-10-CM: R47.81  ICD-9-CM: 784.59  12/12/2013 - 4/8/2016        RESOLVED: Blurry vision, left eye ICD-10-CM: H53.8  ICD-9-CM: 368.8  12/12/2013 - 4/8/2016              HPI:   Mr. Margarita Elizondo is a 73 y.o. with h/o deprsesion, dm, htn, cirrhosis who presents with weakness. He was trying to eat cereal today when he noted right hand weakness. He was unable to pour his milk. The weakness has now resolved. He had TIA in the past, unclear why he is not on asa daily. He denies cp, sob, abd pain (Dr Addie Hancock)    1/7/17: His right arm and hand weakness has improved. He was not on a statin or ASA prior to admission. LDL is 60 though. His sister reports that he lives with his wife who is not in good health. He has had multiple falls in the past few weeks usually at night when he needs to get up to the [address] [place] [address] St. He is slow to answer questions this morning.      Review of Systems: A comprehensive review of systems was negative except for that written in the HPI. Objective:   Physical Exam:     Visit Vitals    /49 (BP 1 Location: Left arm, BP Patient Position: At rest)    Pulse (!) 40    Temp 97.8 °F (36.6 °C)    Resp 17    Ht 5' 9.25\" (1.759 m)    Wt 249 lb (112.9 kg)    SpO2 100%    BMI 36.51 kg/m2      O2 Device: Room air    Temp (24hrs), Av.7 °F (36.5 °C), Min:97.2 °F (36.2 °C), Max:98.2 °F (36.8 °C)         190 -  0700  In: -   Out: 300 [Urine:300]    General:  Alert, cooperative, no distress, appears stated age. Head:  Normocephalic, without obvious abnormality, atraumatic. Eyes:  Conjunctivae/corneas clear. Nose: Nares normal. Septum midline. Mucosa normal. No drainage or sinus tenderness. Throat: Lips, mucosa, and tongue normal. Teeth and gums normal.   Neck: Supple, symmetrical, trachea midline, no adenopathy, thyroid: no enlargement/tenderness/nodules, no carotid bruit and no JVD. Lungs:   Clear to auscultation bilaterally. Heart:  Regular rate and rhythm, S1, S2 normal, no murmur, click, rub or gallop. Abdomen:   Soft, non-tender. Non distended, Bowel sounds normal. No masses,  No organomegaly. Extremities: Extremities with 1+ edema. No calf tenderness or cords. Pulses: 2+ and symmetric all extremities. Skin: Skin color, texture, turgor normal. No rashes or lesions   Neurologic: CNII-XII intact. Alert and oriented X 3. Fine motor of hands and fingers normal.   equal.  No cogwheeling or rigidity. Gait not tested at this time. Sensation grossly normal to touch.   Gross motor of extremities normal.       Data Review:       Recent Days:  Recent Labs      17   1715   WBC  5.1   HGB  12.1   HCT  36.1*   PLT  75*     Recent Labs      17   1715   NA  141   K  4.9   CL  108   CO2  26   GLU  167*   BUN  17   CREA  1.50*   CA  8.6   MG  1.8   ALB  3.0*   TBILI  2.2*   SGOT  38*   ALT  32     No results for input(s): PH, PCO2, PO2, HCO3, FIO2 in the last 72 hours. 24 Hour Results:  Recent Results (from the past 24 hour(s))   GLUCOSE, POC    Collection Time: 01/06/17  4:50 PM   Result Value Ref Range    Glucose (POC) 147 (H) 65 - 100 mg/dL    Performed by Bairon Ryder    CBC WITH AUTOMATED DIFF    Collection Time: 01/06/17  5:15 PM   Result Value Ref Range    WBC 5.1 4.1 - 11.1 K/uL    RBC 3.75 (L) 4.10 - 5.70 M/uL    HGB 12.1 12.1 - 17.0 g/dL    HCT 36.1 (L) 36.6 - 50.3 %    MCV 96.3 80.0 - 99.0 FL    MCH 32.3 26.0 - 34.0 PG    MCHC 33.5 30.0 - 36.5 g/dL    RDW 15.0 (H) 11.5 - 14.5 %    PLATELET 75 (L) 973 - 400 K/uL    NEUTROPHILS 54 32 - 75 %    LYMPHOCYTES 24 12 - 49 %    MONOCYTES 12 5 - 13 %    EOSINOPHILS 9 (H) 0 - 7 %    BASOPHILS 1 0 - 1 %    ABS. NEUTROPHILS 2.7 1.8 - 8.0 K/UL    ABS. LYMPHOCYTES 1.2 0.8 - 3.5 K/UL    ABS. MONOCYTES 0.6 0.0 - 1.0 K/UL    ABS. EOSINOPHILS 0.5 (H) 0.0 - 0.4 K/UL    ABS. BASOPHILS 0.1 0.0 - 0.1 K/UL   METABOLIC PANEL, COMPREHENSIVE    Collection Time: 01/06/17  5:15 PM   Result Value Ref Range    Sodium 141 136 - 145 mmol/L    Potassium 4.9 3.5 - 5.1 mmol/L    Chloride 108 97 - 108 mmol/L    CO2 26 21 - 32 mmol/L    Anion gap 7 5 - 15 mmol/L    Glucose 167 (H) 65 - 100 mg/dL    BUN 17 6 - 20 MG/DL    Creatinine 1.50 (H) 0.70 - 1.30 MG/DL    BUN/Creatinine ratio 11 (L) 12 - 20      GFR est AA 56 (L) >60 ml/min/1.73m2    GFR est non-AA 46 (L) >60 ml/min/1.73m2    Calcium 8.6 8.5 - 10.1 MG/DL    Bilirubin, total 2.2 (H) 0.2 - 1.0 MG/DL    ALT 32 12 - 78 U/L    AST 38 (H) 15 - 37 U/L    Alk.  phosphatase 83 45 - 117 U/L    Protein, total 7.0 6.4 - 8.2 g/dL    Albumin 3.0 (L) 3.5 - 5.0 g/dL    Globulin 4.0 2.0 - 4.0 g/dL    A-G Ratio 0.8 (L) 1.1 - 2.2     MAGNESIUM    Collection Time: 01/06/17  5:15 PM   Result Value Ref Range    Magnesium 1.8 1.6 - 2.4 mg/dL   TROPONIN I    Collection Time: 01/06/17  5:15 PM   Result Value Ref Range    Troponin-I, Qt. <0.04 <0.05 ng/mL   AMMONIA    Collection Time: 01/06/17  5:15 PM   Result Value Ref Range    Ammonia 91 (H) <32 UMOL/L   URINALYSIS W/ REFLEX CULTURE    Collection Time: 01/06/17  6:11 PM   Result Value Ref Range    Color YELLOW/STRAW      Appearance CLEAR CLEAR      Specific gravity 1.012 1.003 - 1.030      pH (UA) 6.5 5.0 - 8.0      Protein NEGATIVE  NEG mg/dL    Glucose NEGATIVE  NEG mg/dL    Ketone NEGATIVE  NEG mg/dL    Bilirubin NEGATIVE  NEG      Blood NEGATIVE  NEG      Urobilinogen 1.0 0.2 - 1.0 EU/dL    Nitrites NEGATIVE  NEG      Leukocyte Esterase NEGATIVE  NEG      WBC 0-4 0 - 4 /hpf    RBC 0-5 0 - 5 /hpf    Epithelial cells FEW FEW /lpf    Bacteria NEGATIVE  NEG /hpf    UA:UC IF INDICATED CULTURE NOT INDICATED BY UA RESULT CNI      Hyaline Cast 0-2 0 - 5 /lpf   GLUCOSE, POC    Collection Time: 01/06/17 10:15 PM   Result Value Ref Range    Glucose (POC) 124 (H) 65 - 100 mg/dL    Performed by [address] [place], [place] [address]vd PANEL    Collection Time: 01/07/17  6:28 AM   Result Value Ref Range    LIPID PROFILE          Cholesterol, total 136 <200 MG/DL    Triglyceride 83 <150 MG/DL    HDL Cholesterol 59 MG/DL    LDL, calculated 60.4 0 - 100 MG/DL    VLDL, calculated 16.6 MG/DL    CHOL/HDL Ratio 2.3 0 - 5.0     GLUCOSE, POC    Collection Time: 01/07/17  8:08 AM   Result Value Ref Range    Glucose (POC) 109 (H) 65 - 100 mg/dL    Performed by Jewel Rivera        Medications reviewed  Current Facility-Administered Medications   Medication Dose Route Frequency    aspirin delayed-release tablet 81 mg  81 mg Oral DAILY    escitalopram oxalate (LEXAPRO) tablet 20 mg  20 mg Oral QHS    lactulose (CHRONULAC) solution 20 g  20 g Oral TID    pantoprazole (PROTONIX) tablet 40 mg  40 mg Oral ACB    propranolol (INDERAL) tablet 40 mg  40 mg Oral BID    rifAXIMin (XIFAXAN) tablet 550 mg  550 mg Oral BID    spironolactone (ALDACTONE) tablet 25 mg  25 mg Oral DAILY    cyclobenzaprine (FLEXERIL) tablet 5 mg  5 mg Oral TID PRN    insulin lispro (HUMALOG) injection 20 Units  20 Units SubCUTAneous TIDAC    insulin glargine (LANTUS) injection 60 Units  60 Units SubCUTAneous DAILY    sodium chloride (NS) flush 5-10 mL  5-10 mL IntraVENous Q8H    sodium chloride (NS) flush 5-10 mL  5-10 mL IntraVENous PRN    glucose chewable tablet 16 g  4 Tab Oral PRN    dextrose (D50W) injection syrg 12.5-25 g  12.5-25 g IntraVENous PRN    glucagon (GLUCAGEN) injection 1 mg  1 mg IntraMUSCular PRN    insulin lispro (HUMALOG) injection   SubCUTAneous QID WITH MEALS       Care Plan discussed with: Patient/Family and Nurse    Total time spent with patient and review of records: 30 minutes.     Justin Corcoran MD

## 2017-01-07 NOTE — CONSULTS
Petros Aldrich davis [place] 81   [address] [facility], operated by [facility], [address]   1930 [facility]       Name:  Marcos Grande   MR#:  [id number]   :  1945   Account #:  [de-identified]    Date of Consultation:  2017   Date of Adm:  2017       REQUESTING PHYSICIAN: Dr. Paco Boyd and Dr. Jeanne Mcconnell. Thanks for asking us to see the patient in neurologic consultation   regarding right-sided weakness, acute neurologic change. He provides   history. I have also reviewed the chart and his images, etc.    HISTORY OF PRESENT ILLNESS: This is a 73-year-old gentleman is   known to me from previous hospital visit back in January of last year. He had seen Dr. Richard Ybarra, our neuropsychologist, and was diagnosed   with vascular dementia. When I saw him secondary to this neuropsych   eval we started him on Namenda. He had an EEG that was   unrevealing and a carotid Doppler that was unrevealing. He never   returned for followup. He says that he was told not to take the   Namenda by a doctor but he is not sure who. In any regard, he comes to the emergency department on the day of   admission secondary to having right-sided weakness. He says that he   was using his walker and notes that he had some trouble moving his   legs and he thinks it was both legs, maybe it was the right leg but he is   not sure, but definitely he went to the refrigerator to get the milk and   when he went to use his right hand he was unable to get the milk out of   the refrigerator and he subsequently fell onto the floor. His wife, who   herself is in a walking boot at present called EMS and he was   subsequently brought in. In the emergency department, he was noted   to have full strength on examination. Apparently, they spoke with his   wife, who noted that he had been falling more often and that she has   been unable to help him because she recently fractured her leg.  She   was noted that he seemed to be needing increased care versus what   she could provide and he was subsequently admitted. His head CT   down in the emergency department demonstrated no acute   abnormality. He did have an MRI done, which also showed nothing   acute. His carotid Doppler still has less than 50% stenosis bilaterally. His LDL is 60 and is on Zocor having ALLERGY TO LIPITOR. He was   not on aspirin at home and his platelets have been low and the   thrombocytopenia has been felt to be secondary to his underlying liver   disease. He has not had any bleeding issues. PAST MEDICAL HISTORY: Significant for   1. Avascular dementia as stated. 2. Diabetes. 3. 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. 4. Hypertension. 5. Peptic ulcer disease. 6. Sleep apnea, uses CPAP. 7. Depression. 8. Achilles tendon repair. 9. Appendectomy. 10. Hernia repair. 11. Tonsillectomy. ALLERGIES   1. CODEINE. 2. LIPITOR. 3. LISINOPRIL. 4. OXYCODONE. 5. PENICILLIN. MEDICATIONS:1   1. Aspirin 81 mg - started on admission. 2. Lexapro 20 mg daily. 3. Insulin. 4. Lactulose. 5. Protonix. 6. Inderal.   7. Rivaroxaban. 8. Aldactone. 9. Typical p.r.n.'s. For DVT prophylaxis he has SCDs in place. REVIEW OF SYSTEMS: As stated. FAMILY HISTORY: He denies any history of stroke in the family. PHYSICAL EXAMINATION   GENERAL: He is lying in bed, asleep when I enter using CPAP. He   awakens quite briskly. He appears comfortable. VITAL SIGNS: He is afebrile, pulse is in the 40s, blood pressure   127/48, oxygen saturation 95% on room air. HEENT: He is anicteric. His oropharynx is clear. EXTREMITIES: He has minimal edema. HEART: Regular, pulses symmetrical.   NEUROLOGIC: He is awake, alert, oriented to [facility],   says that it is January 2017, and the date is around 6th because the   first of the year was on a Sunday.  He recalls the current president as   Everton Scanlon, notes that he will be in the office for another couple weeks and   then Trump enter. He follows all commands. He is fluent. He has full   versions without nystagmus. His face is symmetric. Tongue and palate   midline. He has no pronation or drift. He has no flap. He resists fully in   the upper and lower extremities in all muscle groups to direct testing. Reflexes globally depressed. Toes are mute. No ataxia. Discussion with nursing today finds that he walks 50 feet with therapy. Review of therapy notes finds that they are recommending some   rehabilitation secondary to his Lou Balance Assessment   demonstrating moderate risk for falls, etc.    STUDIES: As stated. IMPRESSION: A 70-year-old gentleman with dementia as stated,   untreated and we can certainly address that as an outpatient. The more acute issue is the right-sided weakness, which I think at this   point, we have to call for absence of other explanation transient   cerebral ischemia. His LDL is good. He has been started on aspirin   and we did discuss the fact that he has thrombocytopenia so are   making the platelets he has which are ultra low less functional.   Discussed working with therapy. Discussed modifying modifiable risk   factors including keeping his sugar tightly controlled. Continue to keep   his glucose control, being more active, eating fresh fruits and   vegetables, using his CPAP machine. From my standpoint, not much to add at present. Continue with the   aspirin and the Zocor. Continue with therapies. We will be happy to   followup again as needed here in the hospital and certainly we expect   to see him in the office in follow I[.     Thanks for your request for consultation.         MD MARIEL Baer / MARGARITA   D:  01/07/2017   16:55   T:  01/07/2017   18:02   Job #:  [id number]

## 2017-01-07 NOTE — PROGRESS NOTES
Bedside and Verbal shift change report given to Kya Garcia RN (oncoming nurse) by ΦΑΡΜΑΚΑΣ, RN (offgoing nurse). Report included the following information SBAR and Kardex.

## 2017-01-07 NOTE — H&P
[address]  [phone]    Admission History and Physical      NAME:  Zuly Mcneill. :   1945   MRN:  [id number]     PCP:  Dominique Calhoun MD     Date/Time:  2017         Subjective:     CHIEF COMPLAINT: weakness     HISTORY OF PRESENT ILLNESS:     Mr. Jia Liu is a 73 y.o. with h/o deprsesion, dm, htn, cirrhosis who presents with weakness. He was trying to eat cereal today when he noted right hand weakness. He was unable to pour his milk. The weakness has now resolved. He had TIA in the past, unclear why he is not on asa daily. He denies cp, sob, abd pain      Past Medical History   Diagnosis Date    Anxiety     Arthritis     Depression     Diabetes mellitus ([address]     Hypertension     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 (HCC)     Muscle pain     PUD (peptic ulcer disease)     Sleep apnea     Snoring     Visual disturbance         Past Surgical History   Procedure Laterality Date    Hx heent      Hx tonsillectomy      Hx orthopaedic       carly tendon repair    Hx appendectomy      Hx hernia repair      Hx hernia repair         Social History   Substance Use Topics    Smoking status: Never Smoker    Smokeless tobacco: Never Used    Alcohol use No        Family History   Problem Relation Age of Onset    Hypertension Other     Diabetes Other         Allergies   Allergen Reactions    Codeine Shortness of Breath    Lipitor [Atorvastatin] Other (comments)     Stiff neck    Lisinopril Myalgia    Oxycodone Other (comments)     Cant walk cant breathe, need to use my c pap machine    Pcn [Penicillins] Shortness of Breath        Prior to Admission medications    Medication Sig Start Date End Date Taking? Authorizing Provider   magnesium oxide (MAG-OX) 400 mg tablet Take 1 Tab by mouth two (2) times a day.  16   Kobi Ibarra MD   rifaximin [place]) 550 mg tablet Take 1 Tab by mouth two (2) times a day. 4/13/16   Nan Salter MD   spironolactone (ALDACTONE) 25 mg tablet Take 12.5 mg by mouth daily. Historical Provider   cyclobenzaprine (FLEXERIL) 10 mg tablet Take 5-10 mg by mouth three (3) times daily as needed for Muscle Spasm(s). Historical Provider   simvastatin (ZOCOR) 40 mg tablet Take 40 mg by mouth nightly. Historical Provider   insulin glargine (LANTUS SOLOSTAR) 100 unit/mL (3 mL) pen 40 Units by SubCUTAneous route nightly. Historical Provider   lactulose (CHRONULAC) 10 gram/15 mL solution Take 10 g by mouth daily (with breakfast). Historical Provider   lactulose (CHRONULAC) 10 gram/15 mL solution Take 10 g by mouth daily (with lunch). Historical Provider   lactulose (CHRONULAC) 10 gram/15 mL solution Take 6.7 g by mouth nightly. Historical Provider   insulin aspart (NOVOLOG) 100 unit/mL inpn 15 Units by SubCUTAneous route Daily (before breakfast). Historical Provider   insulin aspart (NOVOLOG) 100 unit/mL inpn 15 Units by SubCUTAneous route Daily (before dinner). Historical Provider   insulin aspart (NOVOLOG) 100 unit/mL inpn 15 Units by SubCUTAneous route nightly as needed. Take if night time blood sugar is greater than 200    Historical Provider   allopurinol (ZYLOPRIM) 300 mg tablet Take 300 mg by mouth daily. Historical Provider   omeprazole (PRILOSEC) 40 mg capsule Take 40 mg by mouth daily. Historical Provider   escitalopram (LEXAPRO) 20 mg tablet Take 20 mg by mouth nightly. Historical Provider   propranolol (INDERAL) 10 mg tablet Take 10 mg by mouth two (2) times a day. Historical Provider   albuterol (VENTOLIN HFA) 90 mcg/actuation inhaler Take 1 Puff by inhalation every four (4) hours as needed for Wheezing or Shortness of Breath.     Historical Provider         Review of Systems:  Gen:  Eyes:  ENT:  CVS:  Pulm:  GI:    :    MS:  Skin:  Psych:  Endo:    Hem:  Renal:    Neuro:  weakness          Objective:      VITALS:    Vital signs reviewed; most recent are:    Visit Vitals    /55    Pulse (!) 42    Temp 98.2 °F (36.8 °C)    Resp 21    Ht 5' 9.25\" (1.759 m)    Wt 107 kg (236 lb)    SpO2 98%    BMI 34.6 kg/m2     SpO2 Readings from Last 6 Encounters:   01/06/17 98%   04/13/16 97%   01/27/16 96%   01/19/16 95%   01/06/16 97%   10/04/15 100%        No intake or output data in the 24 hours ending 01/06/17 2033         Exam:     Physical Exam:    Gen:  elderly, in no acute distress  HEENT:  Pink conjunctivae, PERRL, hearing intact to voice, moist mucous membranes  Neck:  Supple, without masses, thyroid non-tender  Resp:  No accessory muscle use, clear breath sounds without wheezes rales or rhonchi  Card:  No murmurs, normal S1, S2 without thrills, bruits or peripheral edema  Abd:  Soft, non-tender, non-distended, normoactive bowel sounds are present, no palpable organomegaly  Lymph:  No cervical adenopathy  Musc:  No cyanosis or clubbing  Skin:  No rashes or ulcers, skin turgor is good  Neuro:  Cranial nerves 3-12 are grossly intact,  strength is 5/5 bilaterally, dorsi / plantarflexion strength is 5/5 bilaterally, follows commands appropriately  Psych:  Alert with moderate insight. Oriented to person, place, and time       Labs:    Recent Labs      01/06/17   1715   WBC  5.1   HGB  12.1   HCT  36.1*   PLT  75*     Recent Labs      01/06/17   1715   NA  141   K  4.9   CL  108   CO2  26   GLU  167*   BUN  17   CREA  1.50*   CA  8.6   MG  1.8   ALB  3.0*   SGOT  38*   ALT  32     No components found for: GLPOC  No results for input(s): PH, PCO2, PO2, HCO3, FIO2 in the last 72 hours. No results for input(s): INR in the last 72 hours. No lab exists for component: INREXT           Assessment/Plan:       Right side weakness / Hx-TIA (transient ischemic attack): weakness has now resolved. Stroke echo, carotids done 2013. Order MRI. Start asa. Send A1c and lipid panel.       Essential hypertension: cont BB, BP meds stopped last hospital stay due to hypotension      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: stable. Resume xifaxan, aldactone, lactulose, propranolol      Thrombocytopenia: stable, 2/2 liver disease. monitor      Depression: resume lexapro      DM type 2 (diabetes mellitus, type 2): with renal complications. Resume insulin with SSI      Vascular dementia: stable      CKD (chronic kidney disease) stage 3: unclear baseline but does not appear to be worse.  monitor     DNR/DNI based on medical record review    Signed pt out to Dr. Jeffy Figueroa who agreed to take over care    Total time spent with patient: 69 [phone] discussed with: Patient    Discussed:  Care Plan    Prophylaxis:  SCD's    Probable Disposition:  Home w/Family           ___________________________________________________    Attending Physician: Shankar Alatorre MD

## 2017-01-07 NOTE — PROGRESS NOTES
Report received from [address] [place] [address]. No orders placed for patient. ER will hold patient until hospitalist sees patient and places orders.

## 2017-01-07 NOTE — ED NOTES
Bedside and Verbal shift change report given to Joaquin Lizarraga  (oncoming nurse) by Marvin ABARCA  (offgoing nurse). Report included the following information SBAR.

## 2017-01-07 NOTE — PROCEDURES
[address]  *** FINAL REPORT ***    Name: Monse Llanos  MRN: [id number]    Outpatient  : 28 May 1945  HIS Order #: [id number]  [id number] [facility] Visit #: [id number]  Date: 2017    TYPE OF TEST: Cerebrovascular Duplex    REASON FOR TEST  Referred with right sided weakness. Right Carotid:-             Proximal               Mid                 Distal  cm/s  Systolic  Diastolic  Systolic  Diastolic  Systolic  Diastolic  CCA:    772.4      16.0                           118.3      23.0  Bulb:  ECA:    109.0       6.7  ICA:     59.6       7.9       83.8      20.8       90.3      27.3  ICA/CCA:  0.5       0.3    ICA Stenosis: <50%    Right Vertebral:-  Finding: Antegrade  Sys:       61.2  Angelica:       16.0    Right Subclavian: Normal    Left Carotid:-            Proximal                Mid                 Distal  cm/s  Systolic  Diastolic  Systolic  Diastolic  Systolic  Diastolic  CCA:    146.7      21.5                           121.9      23.4  Bulb:  ECA:    102.2       7.7  ICA:     69.8      11.5      112.9      22.5       83.8      20.8  ICA/CCA:  0.6       0.5    ICA Stenosis: <50%    Left Vertebral:-  Finding: Antegrade  Sys:       88.7  Angelica:       16.0    Left Subclavian: Normal    INTERPRETATION/FINDINGS  PROCEDURE:  Evaluation of the extracranial cerebrovascular arteries  with ultrasound (B-mode imaging, pulsed Doppler, color Doppler). Includes the common carotid, internal carotid, external carotid, and  vertebral arteries. FINDINGS:    IMPRESSION: Findings are consistent with 0-49% range of stenosis of  the right internal carotid and 0-49% range of stenosis of the left  internal carotid. Vertebrals are patent with antegrade flow. ADDITIONAL COMMENTS    I have personally reviewed the data relevant to the interpretation of  this  study. TECHNOLOGIST: Beryl Nevarez RVT  Signed: 2017 11:01 AM    PHYSICIAN: Elmer Porter.  Gregorio Pepe MD  Signed: 2017 09:31 AM

## 2017-01-07 NOTE — PROGRESS NOTES
[facility]: MED RECONCILIATION    Comments/Recommendations:     Medication(s) ADDED to PTA list:  1. ASA 81 mg daily    Medication(s) REMOVED from PTA list:  1. Allopurinol 300 mg daily  2. Simvastatin 40 mg daily    Medication(s) ADJUSTED on PTA list:  1. Novolog dose updated to 20 units TID  2. Lantus dose updated to 60 units daily  3. Lactulose dose updated to 20 gm TID  4. Magnesium oxide changed to daily  5. Propranolol dose changed to 40 mg BID  6. Spironolactone dose changed to 25 mg daily      Information obtained from: Spouse read from Rx bottles (over the phone)    Significant PMH/Disease States:   Past Medical History   Diagnosis Date    Anxiety     Arthritis     Depression     Diabetes mellitus ([facility]     Hypertension     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 ([facility]     Muscle pain     PUD (peptic ulcer disease)     Sleep apnea     Snoring     Visual disturbance      Allergies: Codeine; Lipitor [atorvastatin]; Lisinopril; Oxycodone; and Pcn [penicillins]    Prior to Admission Medications:     Prior to Admission Medications   Prescriptions Last Dose Informant Patient Reported? Taking? albuterol (VENTOLIN HFA) 90 mcg/actuation inhaler  Other Yes Yes   Sig: Take 1 Puff by inhalation every four (4) hours as needed for Wheezing or Shortness of Breath. aspirin delayed-release 81 mg tablet   Yes Yes   Sig: Take 81 mg by mouth daily. cyclobenzaprine (FLEXERIL) 10 mg tablet   Yes Yes   Sig: Take 5-10 mg by mouth three (3) times daily as needed for Muscle Spasm(s). escitalopram (LEXAPRO) 20 mg tablet  Other Yes Yes   Sig: Take 20 mg by mouth nightly. insulin aspart (NOVOLOG) 100 unit/mL inpn 2017 at Unknown time  Yes Yes   Si Units by SubCUTAneous route Before breakfast, lunch, and dinner. insulin glargine (LANTUS SOLOSTAR) 100 unit/mL (3 mL) pen 2017 at Unknown time  Yes Yes   Si Units by SubCUTAneous route daily.    lactulose (CHRONULAC) 10 gram/15 mL solution 2017 at Unknown time  Yes Yes   Sig: Take 20 g by mouth three (3) times daily. magnesium oxide (MAG-OX) 400 mg tablet 1/6/2017 at Unknown time  No Yes   Sig: Take 1 Tab by mouth two (2) times a day. omeprazole (PRILOSEC) 40 mg capsule 1/6/2017 at Unknown time Other Yes Yes   Sig: Take 40 mg by mouth daily. propranolol (INDERAL) 40 mg tablet 1/6/2017 at Unknown time  Yes Yes   Sig: Take 40 mg by mouth two (2) times a day. rifaximin (XIFAXAN) 550 mg tablet 1/6/2017 at Unknown time  No Yes   Sig: Take 1 Tab by mouth two (2) times a day. spironolactone (ALDACTONE) 25 mg tablet 1/6/2017 at Unknown time  Yes Yes   Sig: Take 25 mg by mouth daily.       Facility-Administered Medications: None         Brianne Fitzpatrick, PHARMD   Contact: [phone]

## 2017-01-07 NOTE — PROGRESS NOTES
TRANSFER - IN REPORT:    Verbal report received from Tyshawn Walters RN(name) on Shira Most.  being received from ED(unit) for routine progression of care      Report consisted of patients Situation, Background, Assessment and   Recommendations(SBAR). Information from the following report(s) SBAR, Kardex and ED Summary was reviewed with the receiving nurse. Opportunity for questions and clarification was provided. Assessment completed upon patients arrival to unit and care assumed.      Primary Nurse Romina Eason and Josué Estrada RN performed a dual skin assessment on this patient No impairment noted  Abdirashid score is 18

## 2017-01-07 NOTE — PROGRESS NOTES
Problem: Self Care Deficits Care Plan (Adult)  Goal: *Acute Goals and Plan of Care (Insert Text)  Occupational Therapy Goals  Initiated 1/7/2017  1. Patient will perform grooming with supervision/set-up within 7 day(s). 2. Patient will perform upper body dressing with supervision/set-up within 7 day(s). 3. Patient will perform lower body dressing with moderate assistance within 7 day(s). 4. Patient will perform toilet transfers with minimal assistance/contact guard assist and best technique/adaptive equipment within 7 day(s). 5. Patient will perform all aspects of toileting with moderate assistance within 7 day(s). 6. Patient will participate in upper extremity therapeutic exercise/activities with supervision/set-up for 10 minutes within 7 day(s). 7. Patient will stand for functional task without UE support > or = 2 minutes with min assist within 7 day(s). OCCUPATIONAL THERAPY EVALUATION  Patient: Kumar Major (77 y.o. male)  Date: 1/7/2017  Primary Diagnosis: right side weakness        Precautions:   Fall, skin      ASSESSMENT :  Based on the objective data described below, the patient presents with slow, labored speech, decreased word finding at times and reports this is a \"new\" problem, unable to quantify how new. Demonstrated full AROM bilateral UE and strength WNL, however during finger to nose test demonstrated decreased coordination left non-dominant UE. Right: 16 seconds and left 22 seconds with decreased accuracy to find tip of nose. Patient reports needing increased assist at home with basic ADL's for ~1 month and complaint of decreased ability to don pants due to \"missing\" the target or putting leg in wrong hole. Demonstrated confusion and re-ed on use of call bell. Noted at this time CT and MRI negative, at any rate he is not safe at home and would benefit from rehab. Next session may benefit from further vision assessment.    Patient will benefit from skilled intervention to address the above impairments. Patients rehabilitation potential is considered to be Fair  Factors which may influence rehabilitation potential include:   [ ]                None noted  [X]                Mental ability/status  [X]                Medical condition  [X]                Home/family situation and support systems  [ ]                Safety awareness  [ ]                Pain tolerance/management  [ ]                Other:        PLAN :  Recommendations and Planned Interventions:  [X]                  Self Care Training                  [X]           Therapeutic Activities  [X]                  Functional Mobility Training    [ ]           Cognitive Retraining  [X]                  Therapeutic Exercises           [X]           Endurance Activities  [X]                  Balance Training                   [ ]           Neuromuscular Re-Education  [ ]                  Visual/Perceptual Training     [X]      Home Safety Training  [X]                  Patient Education                 [X]           Family Training/Education  [ ]                  Other (comment):     Frequency/Duration: Patient will be followed by occupational therapy 5 times a week to address goals. Discharge Recommendations: Rehab  Further Equipment Recommendations for Discharge: to be determined       SUBJECTIVE:   Patient stated Mauro Rajput started having trouble dressing myself.  when asked how long ago said ~1 month ago      OBJECTIVE DATA SUMMARY:   HISTORY:   Past Medical History   Diagnosis Date    Anxiety      Arthritis      Depression      Diabetes mellitus ([facility] [address]      Hypertension      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 (HCC)      Muscle pain      PUD (peptic ulcer disease)      Sleep apnea      Snoring      Visual disturbance       Past Surgical History   Procedure Laterality Date    Hx heent        Hx tonsillectomy        Hx orthopaedic           carly tendon repair    Hx appendectomy        Hx hernia repair        Hx hernia repair Prior Level of Function/Home Situation: decreased last month or so however patient likely not a good historian, per patient wife assisting more with ADL's last month, using walker in home, decreased ability to place leg in \"right\" pant leg due to missing it, reports that court took his 's lis cense away ~3 years ago due to having \"4 strokes\" lives with wife who has broken foot      Home Situation  Home Environment: Apartment  One/Two Story Residence: One story  Living Alone: No  Support Systems: Spouse/Significant Other/Partner  Patient Expects to be Discharged to[de-identified] Rehabilitation facility  Current DME Used/Available at Home: [place] [employer], rollator, Walker, rolling, Grab bars, Shower chair, Cane, quad  Tub or Shower Type: Tub/Shower combination  [X]  Right hand dominant             [ ]  Left hand dominant     EXAMINATION OF PERFORMANCE DEFICITS:  Cognitive/Behavioral Status:  Neurologic State: Alert  Orientation Level: Oriented to person;Oriented to place;Oriented to situation;Oriented to time  Cognition: Follows commands; Appropriate for age attention/concentration (harder time getting words out, blamed on CPap)     Perseveration: No perseveration noted     Skin: per RN red in groin due to incontinence  Edema: bilateral LE minimal  Vision/Perceptual:                                Corrective Lenses: Glasses  Range of Motion:  AROM: Within functional limits  PROM: Within functional limits            Noted inability to cross LE's        Strength:  Strength: Generally decreased, functional      Coordination:  Coordination: Generally decreased, functional (however left < right)  Fine Motor Skills-Upper: Left Impaired;Right Impaired    Gross Motor Skills-Upper: Left Impaired;Right Impaired  Tone & Sensation:  Tone: Normal  Sensation: Intact, UE's        Balance:  Sitting: Impaired  Sitting - Static: Good (unsupported); Unsupported  Sitting - Dynamic: Fair (occasional)  Standing: Impaired; With support  Standing - Static: Constant support;Poor  Standing - Dynamic : Poor     Functional Mobility and Transfers for ADLs:  Bed Mobility:  Supine to Sit: Minimum assistance; Additional time;Assist x1  Sit to Supine: Contact guard assistance;Assist x1  Scooting: Minimum assistance; Additional time;Assist x1     Transfers:  Sit to Stand: Moderate assistance;Minimum assistance; Additional time;Assist x1  Stand to Sit: Minimum assistance;Assist x1  Bed to Chair: Moderate assistance;Assist x1;Additional time  Toilet Transfer : Total assistance; Other (comment) (not assessed)     ADL Assessment:  Feeding: Additional time;Setup     Oral Facial Hygiene/Grooming: Minimum assistance; Additional time     Bathing: Maximum assistance; Total assistance     Upper Body Dressing: Minimum assistance     Lower Body Dressing: Maximum assistance; Total assistance (complaint of difficulty getting in right leg, misses target)     Toileting: Total assistance (incontinent)                 ADL Intervention and task modifications:     Educated on fall prevention, provided anti-skid slipper socks and donned with max assist, educated on use of call bell and difference between heart monitor.  Educated on need for rehab due to decreased balance, increased risk of falls and need for increased assist at home        Functional Measure:  Fugl-Jolley Assessment of Motor Recovery after Stroke:   Completed due to complaint of right sided weakness on arrival to hospital  Reflex Activity  Flexors/Biceps/Fingers: Can be elicited  Extensors/Triceps: Can be elicited  Reflex Subtotal: 4     Volitional Movement Within Synergies  Shoulder Retraction: Full  Shoulder Elevation: Full  Shoulder Abduction (90 degrees): Full  Shoulder External Rotation: Full  Elbow Flexion: Full  Forearm Supination: Full  Shoulder Adduction/Internal Rotation: Full  Elbow Extension: Full  Forearm Pronation: Full  Subtotal: 18     Volitional Movement Mixing Synergies  Hand to Lumbar Spine: Full  Shoulder Flexion (0-90 degrees): Full  Pronation-Supination: Full  Subtotal: 6     Volitional Movement With Little or No Synergy  Shoulder Abduction (0-90 degrees): Full  Shoulder Flexion ( degrees): Full  Pronation/Supination: Full  Subtotal : 6     Normal Reflex Activity  Biceps, Triceps, Finger Flexors: Full  Subtotal : 2     Upper Extremity Total   Upper Extremity Total: 36     Wrist  Stability at 15 Degree Dorsiflexion: Full  Repeated Dorsiflexion/ Volar Flexion: Full  Stability at 15 Degree Dorsiflexion: Full  Repeated Dorsiflexion/ Volar Flexion: Full  Circumduction: Full  Wrist Total: 10     Hand  Mass Flexion: Full  Mass Extension: Full  Grasp A: Partial  Grasp B: Full  Grasp C: Full  Grasp D: Full  Grasp E: Full  Hand Total: 13     Coordination/Speed  Tremor: Slight  Dysmetria: Slight (left worse than right)  Time: >5s (on the left, all other testing for right UE)  Coordination/Speed Total : 2     Total A-D  Total A-D (Motor Function): 61/66      Percentage of impairment CH  0% CI  1-19% CJ  20-39% CK  40-59% CL  60-79% CM  80-99% CN  100%   Fugl-Jolley score: 0-66 66 53-65 39-52 26-38 13-25 1-12    0      This is a reliable/valid measure of arm function after a neurological event. It has established value to characterize functional status and for measuring spontaneous and therapy-induced recovery; tests proximal and distal motor functions. Fugl-Jolley Assessment  UE scores recorded between five and 30 days post neurologic event can be used to predict UE recovery at six months post neurologic event. Severe = 0-21 points   Moderately Severe = 22-33 points   Moderate = 34-47 points   Mild = 48-66 points  JORDAN Cornejo, TRINI Rene, & BASIA Nichols (1992). Measurement of motor recovery after stroke: Outcome assessment and sample size requirements. Stroke, 23, pp. 4587-2662. ------------------------------------------------------------------------------------------------------------------------------------------------------------------  MCID:  Stroke:   Igor Chavira et al, 2001; n = 171; mean age 79 (6) years; assessed within 16 (12) days of stroke, Acute Stroke)  FMA Motor Scores from Admission to Discharge   · 10 point increase in FMA Upper Extremity = 1.5 change in discharge FIM  · 10 point increase in FMA Lower Extremity = 1.9 change in discharge FIM  MDC:   Stroke:   Fabienne Warner et al, 2008, n = 14, mean age = 59.9 (14.6) years, assessed on average 14 (6.5) months post stroke, Chronic Stroke)   · FMA = 5.2 points for the Upper Extremity portion of the assessment      Barthel Index:  Completed due to MRI and CT negative for CVA at this time      Bathin  Bladder: 0  Bowels: 10  Groomin  Dressin  Feedin  Mobility: 0  Stairs: 0  Toilet Use: 5  Transfer (Bed to Chair and Back): 5  Total: 25         Barthel and G-code impairment scale:  Percentage of impairment CH  0% CI  1-19% CJ  20-39% CK  40-59% CL  60-79% CM  80-99% CN  100%   Barthel Score 0-100 100 99-80 79-60 59-40 20-39 1-19    0   Barthel Score 0-20 20 17-19 13-16 9-12 5-8 1-4 0      The Barthel ADL Index: Guidelines  1. The index should be used as a record of what a patient does, not as a record of what a patient could do. 2. The main aim is to establish degree of independence from any help, physical or verbal, however minor and for whatever reason. 3. The need for supervision renders the patient not independent. 4. A patient's performance should be established using the best available evidence. Asking the patient, friends/relatives and nurses are the usual sources, but direct observation and common sense are also important. However direct testing is not needed. 5. Usually the patient's performance over the preceding 24-48 hours is important, but occasionally longer periods will be relevant.   6. Middle categories imply that the patient supplies over 50 per cent of the effort. 7. Use of aids to be independent is allowed. Wilmar Blanchard., Barthel, D.W. (1898). Functional evaluation: the Barthel Index. [address] (14)2. NELL Weaver, Nancy Ortiz., Anni Vilchis., [place], [address] [facility] (1999). Measuring the change indisability after inpatient rehabilitation; comparison of the responsiveness of the Barthel Index and Functional [place] Measure. Journal of Neurology, Neurosurgery, and Psychiatry, 66(4), 336-017. CALVIN Salgado.A, BIJAN Hardin, & Ari Rollins M.A. (2004.) Assessment of post-stroke quality of life in cost-effectiveness studies: The usefulness of the Barthel Index and the EuroQoL-5D. Quality of Life Research, 13, 612-51            G codes: In compliance with CMSs Claims Based Outcome Reporting, the following G-code set was chosen for this patient based on their primary functional limitation being treated: The outcome measure chosen to determine the severity of the functional limitation was the Barthel Index with a score of 25/100 which was correlated with the impairment scale. · Self Care:               - CURRENT STATUS:    CL - 60%-79% impaired, limited or restricted               - GOAL STATUS:           CK - 40%-59% impaired, limited or restricted               - D/C STATUS:                       ---------------To be determined---------------            Pain:  Pain Scale 1: Numeric (0 - 10)  Pain Intensity 1: 0      right elbow due to hitting when he fell        Activity Tolerance:   Good   Please refer to the flowsheet for vital signs taken during this treatment.   After treatment:   [ ]  Patient left in no apparent distress sitting up in chair  [X]  Patient left in no apparent distress in bed  [X]  Call bell left within reach  [X]  Nursing notified  [ ]  Caregiver present  [X]  Bed alarm activated      COMMUNICATION/EDUCATION:   The patients plan of care was discussed with: Physical Therapist and Registered Nurse. Patient was educated regarding His deficit(s) of decreased word finding, decreased left UE coordination, as this relates to His diagnosis of ?TIA. He demonstrated Good understanding as evidenced by verbalizing agreement to symptoms. [X]    Home safety education was provided and the patient/caregiver indicated understanding. [X]    Patient/family have participated as able in goal setting and plan of care. [ ]    Patient/family agree to work toward stated goals and plan of care. [ ]    Patient understands intent and goals of therapy, but is neutral about his/her participation. [ ]    Patient is unable to participate in goal setting and plan of care. This patients plan of care is appropriate for delegation to [facility].      Thank you for this referral.  Wes Sharma, OTR/L  Time Calculation: 35 mins

## 2017-01-08 LAB
ALBUMIN SERPL BCP-MCNC: 2.5 G/DL (ref 3.5–5)
ALBUMIN/GLOB SERPL: 0.7 {RATIO} (ref 1.1–2.2)
ALP SERPL-CCNC: 58 U/L (ref 45–117)
ALT SERPL-CCNC: 22 U/L (ref 12–78)
AMMONIA PLAS-SCNC: 81 UMOL/L
ANION GAP BLD CALC-SCNC: 9 MMOL/L (ref 5–15)
AST SERPL W P-5'-P-CCNC: 29 U/L (ref 15–37)
ATRIAL RATE: 45 BPM
BASOPHILS # BLD AUTO: 0 K/UL (ref 0–0.1)
BASOPHILS # BLD: 1 % (ref 0–1)
BILIRUB SERPL-MCNC: 2 MG/DL (ref 0.2–1)
BUN SERPL-MCNC: 19 MG/DL (ref 6–20)
BUN/CREAT SERPL: 13 (ref 12–20)
CALCIUM SERPL-MCNC: 8.3 MG/DL (ref 8.5–10.1)
CALCULATED P AXIS, ECG09: -3 DEGREES
CALCULATED R AXIS, ECG10: -11 DEGREES
CALCULATED T AXIS, ECG11: 26 DEGREES
CHLORIDE SERPL-SCNC: 109 MMOL/L (ref 97–108)
CO2 SERPL-SCNC: 24 MMOL/L (ref 21–32)
CREAT SERPL-MCNC: 1.5 MG/DL (ref 0.7–1.3)
DIAGNOSIS, 93000: NORMAL
EOSINOPHIL # BLD: 0.5 K/UL (ref 0–0.4)
EOSINOPHIL NFR BLD: 8 % (ref 0–7)
ERYTHROCYTE [DISTWIDTH] IN BLOOD BY AUTOMATED COUNT: 14.6 % (ref 11.5–14.5)
GLOBULIN SER CALC-MCNC: 3.6 G/DL (ref 2–4)
GLUCOSE BLD STRIP.AUTO-MCNC: 112 MG/DL (ref 65–100)
GLUCOSE BLD STRIP.AUTO-MCNC: 190 MG/DL (ref 65–100)
GLUCOSE BLD STRIP.AUTO-MCNC: 213 MG/DL (ref 65–100)
GLUCOSE BLD STRIP.AUTO-MCNC: 214 MG/DL (ref 65–100)
GLUCOSE SERPL-MCNC: 127 MG/DL (ref 65–100)
HCT VFR BLD AUTO: 32.9 % (ref 36.6–50.3)
HGB BLD-MCNC: 11.4 G/DL (ref 12.1–17)
LYMPHOCYTES # BLD AUTO: 25 % (ref 12–49)
LYMPHOCYTES # BLD: 1.5 K/UL (ref 0.8–3.5)
MCH RBC QN AUTO: 32.7 PG (ref 26–34)
MCHC RBC AUTO-ENTMCNC: 34.7 G/DL (ref 30–36.5)
MCV RBC AUTO: 94.3 FL (ref 80–99)
MONOCYTES # BLD: 0.8 K/UL (ref 0–1)
MONOCYTES NFR BLD AUTO: 13 % (ref 5–13)
NEUTS SEG # BLD: 3.3 K/UL (ref 1.8–8)
NEUTS SEG NFR BLD AUTO: 53 % (ref 32–75)
P-R INTERVAL, ECG05: 208 MS
PLATELET # BLD AUTO: 73 K/UL (ref 150–400)
POTASSIUM SERPL-SCNC: 4.5 MMOL/L (ref 3.5–5.1)
PROT SERPL-MCNC: 6.1 G/DL (ref 6.4–8.2)
Q-T INTERVAL, ECG07: 512 MS
QRS DURATION, ECG06: 94 MS
QTC CALCULATION (BEZET), ECG08: 442 MS
RBC # BLD AUTO: 3.49 M/UL (ref 4.1–5.7)
SERVICE CMNT-IMP: ABNORMAL
SODIUM SERPL-SCNC: 142 MMOL/L (ref 136–145)
VENTRICULAR RATE, ECG03: 45 BPM
WBC # BLD AUTO: 6.2 K/UL (ref 4.1–11.1)

## 2017-01-08 PROCEDURE — 80053 COMPREHEN METABOLIC PANEL: CPT | Performed by: FAMILY MEDICINE

## 2017-01-08 PROCEDURE — 94660 CPAP INITIATION&MGMT: CPT

## 2017-01-08 PROCEDURE — 74011250637 HC RX REV CODE- 250/637: Performed by: INTERNAL MEDICINE

## 2017-01-08 PROCEDURE — 36415 COLL VENOUS BLD VENIPUNCTURE: CPT | Performed by: FAMILY MEDICINE

## 2017-01-08 PROCEDURE — 77030013048 HC MSK CPAP W/HDGR FISP -B

## 2017-01-08 PROCEDURE — A9270 NON-COVERED ITEM OR SERVICE: HCPCS | Performed by: INTERNAL MEDICINE

## 2017-01-08 PROCEDURE — 82962 GLUCOSE BLOOD TEST: CPT

## 2017-01-08 PROCEDURE — 74011636637 HC RX REV CODE- 636/637: Performed by: INTERNAL MEDICINE

## 2017-01-08 PROCEDURE — 99218 HC RM OBSERVATION: CPT

## 2017-01-08 PROCEDURE — 77030013140 HC MSK NEB VYRM -A

## 2017-01-08 PROCEDURE — 74011250637 HC RX REV CODE- 250/637: Performed by: FAMILY MEDICINE

## 2017-01-08 PROCEDURE — 93306 TTE W/DOPPLER COMPLETE: CPT

## 2017-01-08 PROCEDURE — 85025 COMPLETE CBC W/AUTO DIFF WBC: CPT | Performed by: FAMILY MEDICINE

## 2017-01-08 PROCEDURE — 82140 ASSAY OF AMMONIA: CPT | Performed by: FAMILY MEDICINE

## 2017-01-08 RX ORDER — PROPRANOLOL HYDROCHLORIDE 10 MG/1
20 TABLET ORAL 2 TIMES DAILY
Status: DISCONTINUED | OUTPATIENT
Start: 2017-01-08 | End: 2017-01-09

## 2017-01-08 RX ORDER — SIMVASTATIN 20 MG/1
40 TABLET, FILM COATED ORAL
Status: DISCONTINUED | OUTPATIENT
Start: 2017-01-08 | End: 2017-01-12 | Stop reason: HOSPADM

## 2017-01-08 RX ADMIN — Medication 10 ML: at 22:37

## 2017-01-08 RX ADMIN — ESCITALOPRAM OXALATE 20 MG: 10 TABLET ORAL at 22:36

## 2017-01-08 RX ADMIN — PROPRANOLOL HYDROCHLORIDE 20 MG: 10 TABLET ORAL at 22:36

## 2017-01-08 RX ADMIN — SIMVASTATIN 40 MG: 20 TABLET, FILM COATED ORAL at 22:36

## 2017-01-08 RX ADMIN — PROPRANOLOL HYDROCHLORIDE 40 MG: 10 TABLET ORAL at 08:41

## 2017-01-08 RX ADMIN — INSULIN GLARGINE 60 UNITS: 100 INJECTION, SOLUTION SUBCUTANEOUS at 08:47

## 2017-01-08 RX ADMIN — ASPIRIN 81 MG: 81 TABLET, COATED ORAL at 08:41

## 2017-01-08 RX ADMIN — LACTULOSE 20 G: 10 SOLUTION ORAL at 08:40

## 2017-01-08 RX ADMIN — RIFAXIMIN 550 MG: 550 TABLET ORAL at 08:41

## 2017-01-08 RX ADMIN — INSULIN LISPRO 3 UNITS: 100 INJECTION, SOLUTION INTRAVENOUS; SUBCUTANEOUS at 11:58

## 2017-01-08 RX ADMIN — INSULIN LISPRO 3 UNITS: 100 INJECTION, SOLUTION INTRAVENOUS; SUBCUTANEOUS at 17:03

## 2017-01-08 RX ADMIN — INSULIN LISPRO 20 UNITS: 100 INJECTION, SOLUTION INTRAVENOUS; SUBCUTANEOUS at 08:40

## 2017-01-08 RX ADMIN — INSULIN LISPRO 20 UNITS: 100 INJECTION, SOLUTION INTRAVENOUS; SUBCUTANEOUS at 16:58

## 2017-01-08 RX ADMIN — PANTOPRAZOLE SODIUM 40 MG: 40 TABLET, DELAYED RELEASE ORAL at 07:30

## 2017-01-08 RX ADMIN — INSULIN LISPRO 20 UNITS: 100 INJECTION, SOLUTION INTRAVENOUS; SUBCUTANEOUS at 11:58

## 2017-01-08 RX ADMIN — LACTULOSE 20 G: 10 SOLUTION ORAL at 16:55

## 2017-01-08 RX ADMIN — SPIRONOLACTONE 25 MG: 25 TABLET ORAL at 08:41

## 2017-01-08 RX ADMIN — RIFAXIMIN 550 MG: 550 TABLET ORAL at 22:36

## 2017-01-08 RX ADMIN — LACTULOSE 20 G: 10 SOLUTION ORAL at 22:37

## 2017-01-08 NOTE — PROGRESS NOTES
Bedside and Verbal shift change report given to [facility] Sue Iqbal (oncoming nurse) by Kate Vargas RN (offgoing nurse). Report included the following information SBAR, Kardex, ED Summary, Procedure Summary, MAR, Accordion, Recent Results and Med Rec Status.

## 2017-01-08 NOTE — PROGRESS NOTES
Daily Progress Note: 1/8/2017  Gerardo Vega MD    Assessment/Plan:   1. Right side weakness / Hx-TIA (transient ischemic attack): weakness has now resolved. --- ECHO  --- MRI. normal   ---Start asa.   ---A1c 7.8%,  lipid panel.  ---Neuro Consult  ---PT/OT consult  ---Repeat carotid dopplers     2.  Essential hypertension:   ---cont BB,   ---BP meds stopped last hospital stay due to hypotension     3. 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: stable. ---Resume xifaxan, aldactone, lactulose, propranolol  ---Ammonia 81 today- down from 91. Monitor     4. Thrombocytopenia: stable, 2/2 liver disease. ---monitor     5. Depression:   ---resume lexapro     6. DM type 2 (diabetes mellitus, type 2): with renal complications.   ---Resume insulin with SSI  ---Check A1c     7.  Vascular dementia: stable     8.  CKD (chronic kidney disease) stage 3: unclear baseline but does not appear to be worse. ---monitor    9. Bradycardia in the 40s  ---Reduce Inderal to 20mg BID- may need to reduce further    10. Disp- he lives with his wife who is not in good health. Has had multiple falls over the last few weeks. They have a medical alert and this has been activated numerous times.  Will ask case management for assistance.     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  ICD-9-CM: 435.8  1/7/2017 - Present        CKD (chronic kidney disease) stage 3, GFR 30-59 ml/min ICD-10-CM: N18.3  ICD-9-CM: 585.3  1/6/2017 - Present        ARF (acute renal failure) ([facility] ICD-10-CM: N17.9  ICD-9-CM: 584.9  4/8/2016 - Present        Dizziness ICD-10-CM: R42  ICD-9-CM: 780.4  4/8/2016 - Present        LETY on CPAP (Chronic) ICD-10-CM: G47.33  ICD-9-CM: 327.23  4/8/2016 - Present        DM type 2 (diabetes mellitus, type 2) ([facility] (Chronic) ICD-10-CM: E11.9  ICD-9-CM: 250.00  1/16/2016 - Present        Vascular dementia (Chronic) ICD-10-CM: F01.50  ICD-9-CM: 290.40  1/16/2016 - Present        Hx-TIA (transient ischemic attack) (Chronic) ICD-10-CM: Z86.73  ICD-9-CM: V12.54  1/16/2016 - Present        Hyponatremia ICD-10-CM: E87.1  ICD-9-CM: 276.1  1/16/2016 - Present        Essential hypertension (Chronic) ICD-10-CM: I10  ICD-9-CM: 401.9  12/12/2013 - Present        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 ([facility] (Chronic) ICD-10-CM: K75.81, K74.60  ICD-9-CM: 571.8, 571.5  12/12/2013 - Present        Thrombocytopenia (HCC) (Chronic) ICD-10-CM: D69.6  ICD-9-CM: 287.5  12/12/2013 - Present        Depression (Chronic) ICD-10-CM: F32.9  ICD-9-CM: 421  12/12/2013 - Present        RESOLVED: Acute hepatic encephalopathy ([facility] ICD-10-CM: K72.00  ICD-9-CM: 572.2  1/16/2016 - 4/8/2016        RESOLVED: Dehydration, moderate ICD-10-CM: E86.0  ICD-9-CM: 276.51  1/16/2016 - 4/8/2016        RESOLVED: Lactic acidosis ICD-10-CM: W43.3  ICD-9-CM: 276.2  1/16/2016 - 4/8/2016        RESOLVED: Metabolic encephalopathy CQU-[id number]: G93.41  ICD-9-CM: 348.31  8/19/2014 - 4/8/2016        RESOLVED: TIA (transient ischemic attack) ICD-10-CM: G45.9  ICD-9-CM: 435.9  12/12/2013 - 4/8/2016        RESOLVED: Slurred speech ICD-10-CM: R47.81  ICD-9-CM: 784.59  12/12/2013 - 4/8/2016        RESOLVED: Blurry vision, left eye ICD-10-CM: H53.8  ICD-9-CM: 368.8  12/12/2013 - 4/8/2016              HPI:   Mr. Tanesha St is a 73 y.o. with h/o deprsesion, dm, htn, cirrhosis who presents with weakness. He was trying to eat cereal today when he noted right hand weakness. He was unable to pour his milk. The weakness has now resolved. He had TIA in the past, unclear why he is not on asa daily. He denies cp, sob, abd pain (Dr Jcarlos Simons)    1/7/17: His right arm and hand weakness has improved. He was not on a statin or ASA prior to admission. LDL is 60 though.  His sister reports that he lives with his wife who is not in good health. He has had multiple falls in the past few weeks usually at night when he needs to get up to the [address] He is slow to answer questions this morning. : Had a bad night. Didn't sleep well last night. HR has been slow so will decrease his Inderal to 20mg BID. Appreciate neuro consult. Statin was recommended so will order this. Review of Systems:   A comprehensive review of systems was negative except for that written in the HPI. Objective:   Physical Exam:     Visit Vitals    /62 (BP 1 Location: Right arm, BP Patient Position: At rest)    Pulse (!) 41    Temp 97.7 °F (36.5 °C)    Resp 18    Ht 5' 9.25\" (1.759 m)    Wt 249 lb (112.9 kg)    SpO2 100%    BMI 36.51 kg/m2      O2 Device: Room air    Temp (24hrs), Av.1 °F (36.7 °C), Min:97.6 °F (36.4 °C), Max:98.7 °F (37.1 °C)         1901 -  0700  In: -   Out: 300 [Urine:300]    General:  Alert, cooperative, no distress, appears stated age. Head:  Normocephalic, without obvious abnormality, atraumatic. Eyes:  Conjunctivae/corneas clear. Nose: Nares normal. Septum midline. Mucosa normal. No drainage or sinus tenderness. Throat: Lips, mucosa, and tongue normal. Teeth and gums normal.   Neck: Supple, symmetrical, trachea midline, no adenopathy, thyroid: no enlargement/tenderness/nodules, no carotid bruit and no JVD. Lungs:   Clear to auscultation bilaterally. Heart:  Regular rhythm, Rate bradycardic. S1, S2 normal, no murmur, click, rub or gallop. Abdomen:   Soft, non-tender. Non distended, Bowel sounds normal. No masses,  No organomegaly. Extremities: Extremities with 1+ edema. No calf tenderness or cords. Pulses: 2+ and symmetric all extremities. Skin: Skin color, texture, turgor normal. No rashes or lesions   Neurologic: CNII-XII intact. Alert and oriented X 3. Fine motor of hands and fingers normal.   equal.  No cogwheeling or rigidity. Gait not tested at this time.   Sensation grossly normal to touch. Gross motor of extremities normal.       Data Review:       Recent Days:  Recent Labs      01/08/17   0434  01/06/17   1715   WBC  6.2  5.1   HGB  11.4*  12.1   HCT  32.9*  36.1*   PLT  73*  75*     Recent Labs      01/08/17   0434  01/06/17   1715   NA  142  141   K  4.5  4.9   CL  109*  108   CO2  24  26   GLU  127*  167*   BUN  19  17   CREA  1.50*  1.50*   CA  8.3*  8.6   MG   --   1.8   ALB  2.5*  3.0*   TBILI  2.0*  2.2*   SGOT  29  38*   ALT  22  32     No results for input(s): PH, PCO2, PO2, HCO3, FIO2 in the last 72 hours. 24 Hour Results:  Recent Results (from the past 24 hour(s))   GLUCOSE, POC    Collection Time: 01/07/17 11:26 AM   Result Value Ref Range    Glucose (POC) 120 (H) 65 - 100 mg/dL    Performed by Keagan Andersen (PCT)    GLUCOSE, POC    Collection Time: 01/07/17  4:31 PM   Result Value Ref Range    Glucose (POC) 158 (H) 65 - 100 mg/dL    Performed by Keagan Andersen (PCT)    GLUCOSE, POC    Collection Time: 01/07/17  9:25 PM   Result Value Ref Range    Glucose (POC) 157 (H) 65 - 100 mg/dL    Performed by Gilberto Sharma (PCT)    AMMONIA    Collection Time: 01/08/17  4:34 AM   Result Value Ref Range    Ammonia 81 (H) <04 UMOL/L   METABOLIC PANEL, COMPREHENSIVE    Collection Time: 01/08/17  4:34 AM   Result Value Ref Range    Sodium 142 136 - 145 mmol/L    Potassium 4.5 3.5 - 5.1 mmol/L    Chloride 109 (H) 97 - 108 mmol/L    CO2 24 21 - 32 mmol/L    Anion gap 9 5 - 15 mmol/L    Glucose 127 (H) 65 - 100 mg/dL    BUN 19 6 - 20 MG/DL    Creatinine 1.50 (H) 0.70 - 1.30 MG/DL    BUN/Creatinine ratio 13 12 - 20      GFR est AA 56 (L) >60 ml/min/1.73m2    GFR est non-AA 46 (L) >60 ml/min/1.73m2    Calcium 8.3 (L) 8.5 - 10.1 MG/DL    Bilirubin, total 2.0 (H) 0.2 - 1.0 MG/DL    ALT 22 12 - 78 U/L    AST 29 15 - 37 U/L    Alk.  phosphatase 58 45 - 117 U/L    Protein, total 6.1 (L) 6.4 - 8.2 g/dL    Albumin 2.5 (L) 3.5 - 5.0 g/dL    Globulin 3.6 2.0 - 4.0 g/dL    A-G Ratio 0.7 (L) 1.1 - 2. 2     CBC WITH AUTOMATED DIFF    Collection Time: 01/08/17  4:34 AM   Result Value Ref Range    WBC 6.2 4.1 - 11.1 K/uL    RBC 3.49 (L) 4.10 - 5.70 M/uL    HGB 11.4 (L) 12.1 - 17.0 g/dL    HCT 32.9 (L) 36.6 - 50.3 %    MCV 94.3 80.0 - 99.0 FL    MCH 32.7 26.0 - 34.0 PG    MCHC 34.7 30.0 - 36.5 g/dL    RDW 14.6 (H) 11.5 - 14.5 %    PLATELET 73 (L) 334 - 400 K/uL    NEUTROPHILS 53 32 - 75 %    LYMPHOCYTES 25 12 - 49 %    MONOCYTES 13 5 - 13 %    EOSINOPHILS 8 (H) 0 - 7 %    BASOPHILS 1 0 - 1 %    ABS. NEUTROPHILS 3.3 1.8 - 8.0 K/UL    ABS. LYMPHOCYTES 1.5 0.8 - 3.5 K/UL    ABS. MONOCYTES 0.8 0.0 - 1.0 K/UL    ABS. EOSINOPHILS 0.5 (H) 0.0 - 0.4 K/UL    ABS.  BASOPHILS 0.0 0.0 - 0.1 K/UL   GLUCOSE, POC    Collection Time: 01/08/17  7:15 AM   Result Value Ref Range    Glucose (POC) 112 (H) 65 - 100 mg/dL    Performed by Cassidy Richey (PCT)        Medications reviewed  Current Facility-Administered Medications   Medication Dose Route Frequency    aspirin delayed-release tablet 81 mg  81 mg Oral DAILY    escitalopram oxalate (LEXAPRO) tablet 20 mg  20 mg Oral QHS    lactulose (CHRONULAC) solution 20 g  20 g Oral TID    pantoprazole (PROTONIX) tablet 40 mg  40 mg Oral ACB    propranolol (INDERAL) tablet 40 mg  40 mg Oral BID    rifAXIMin (XIFAXAN) tablet 550 mg  550 mg Oral BID    spironolactone (ALDACTONE) tablet 25 mg  25 mg Oral DAILY    cyclobenzaprine (FLEXERIL) tablet 5 mg  5 mg Oral TID PRN    insulin lispro (HUMALOG) injection 20 Units  20 Units SubCUTAneous TIDAC    insulin glargine (LANTUS) injection 60 Units  60 Units SubCUTAneous DAILY    sodium chloride (NS) flush 5-10 mL  5-10 mL IntraVENous Q8H    sodium chloride (NS) flush 5-10 mL  5-10 mL IntraVENous PRN    glucose chewable tablet 16 g  4 Tab Oral PRN    dextrose (D50W) injection syrg 12.5-25 g  12.5-25 g IntraVENous PRN    glucagon (GLUCAGEN) injection 1 mg  1 mg IntraMUSCular PRN    insulin lispro (HUMALOG) injection   SubCUTAneous QID WITH MEALS       Care Plan discussed with: Patient/Family and Nurse    Total time spent with patient and review of records: 30 minutes.     Anthony Fuentes MD

## 2017-01-08 NOTE — CONSULTS
Yoandy Fuentes MD Physician Signed Neurology Consults Date of Service: 17 180         []Hide copied text  []Hover for attribution information  Petros Huizar [address]   [address] [facility], [address] [place] [address]  Name: Ben Benz   MR#: [id number]   : 1945   Account #: [[id number]] Date of Consultation: 2017   Date of Adm: 2017         REQUESTING PHYSICIAN: Dr. America Lofton and Dr. Pradeep Chavez.      Thanks for asking us to see the patient in neurologic consultation   regarding right-sided weakness, acute neurologic change. He provides   history. I have also reviewed the chart and his images, etc.          ____________________________________________________________________    *   Please refer to the above consult performed at performed date and time.       MORRIS Elizondo  ~ Neurology NP ~

## 2017-01-08 NOTE — PROGRESS NOTES
Bedside and Verbal shift change report given to Rand Bhatti RN (oncoming nurse) by Armida Kessler RN (offgoing nurse). Report included the following information SBAR, Intake/Output, MAR, Accordion and Recent Results.

## 2017-01-09 ENCOUNTER — APPOINTMENT (OUTPATIENT)
Dept: MRI IMAGING | Age: 72
DRG: 442 | End: 2017-01-09
Attending: PSYCHIATRY & NEUROLOGY
Payer: MEDICARE

## 2017-01-09 PROBLEM — R79.89 INCREASED AMMONIA LEVEL: Status: ACTIVE | Noted: 2017-01-09

## 2017-01-09 PROBLEM — I67.89 CEREBRAL MICROVASCULAR DISEASE: Status: ACTIVE | Noted: 2017-01-09

## 2017-01-09 PROBLEM — R53.1 RIGHT SIDED WEAKNESS: Status: ACTIVE | Noted: 2017-01-09

## 2017-01-09 LAB
ALBUMIN SERPL BCP-MCNC: 2.4 G/DL (ref 3.5–5)
ALBUMIN/GLOB SERPL: 0.6 {RATIO} (ref 1.1–2.2)
ALP SERPL-CCNC: 61 U/L (ref 45–117)
ALT SERPL-CCNC: 28 U/L (ref 12–78)
AMMONIA PLAS-SCNC: 194 UMOL/L
ANION GAP BLD CALC-SCNC: 7 MMOL/L (ref 5–15)
AST SERPL W P-5'-P-CCNC: 35 U/L (ref 15–37)
BASOPHILS # BLD AUTO: 0.1 K/UL (ref 0–0.1)
BASOPHILS # BLD: 1 % (ref 0–1)
BILIRUB SERPL-MCNC: 1.5 MG/DL (ref 0.2–1)
BUN SERPL-MCNC: 19 MG/DL (ref 6–20)
BUN/CREAT SERPL: 13 (ref 12–20)
CALCIUM SERPL-MCNC: 8.5 MG/DL (ref 8.5–10.1)
CHLORIDE SERPL-SCNC: 108 MMOL/L (ref 97–108)
CO2 SERPL-SCNC: 23 MMOL/L (ref 21–32)
CREAT SERPL-MCNC: 1.52 MG/DL (ref 0.7–1.3)
EOSINOPHIL # BLD: 0.5 K/UL (ref 0–0.4)
EOSINOPHIL NFR BLD: 7 % (ref 0–7)
ERYTHROCYTE [DISTWIDTH] IN BLOOD BY AUTOMATED COUNT: 14.6 % (ref 11.5–14.5)
GLOBULIN SER CALC-MCNC: 3.7 G/DL (ref 2–4)
GLUCOSE BLD STRIP.AUTO-MCNC: 129 MG/DL (ref 65–100)
GLUCOSE BLD STRIP.AUTO-MCNC: 131 MG/DL (ref 65–100)
GLUCOSE BLD STRIP.AUTO-MCNC: 176 MG/DL (ref 65–100)
GLUCOSE BLD STRIP.AUTO-MCNC: 72 MG/DL (ref 65–100)
GLUCOSE BLD STRIP.AUTO-MCNC: 83 MG/DL (ref 65–100)
GLUCOSE SERPL-MCNC: 186 MG/DL (ref 65–100)
HCT VFR BLD AUTO: 33.4 % (ref 36.6–50.3)
HGB BLD-MCNC: 11.1 G/DL (ref 12.1–17)
LYMPHOCYTES # BLD AUTO: 23 % (ref 12–49)
LYMPHOCYTES # BLD: 1.5 K/UL (ref 0.8–3.5)
MCH RBC QN AUTO: 31.8 PG (ref 26–34)
MCHC RBC AUTO-ENTMCNC: 33.2 G/DL (ref 30–36.5)
MCV RBC AUTO: 95.7 FL (ref 80–99)
MONOCYTES # BLD: 0.8 K/UL (ref 0–1)
MONOCYTES NFR BLD AUTO: 12 % (ref 5–13)
NEUTS SEG # BLD: 3.7 K/UL (ref 1.8–8)
NEUTS SEG NFR BLD AUTO: 57 % (ref 32–75)
NRBC # BLD: 0 K/UL (ref 0–0.01)
NRBC BLD-RTO: 0 PER 100 WBC
PLATELET # BLD AUTO: 70 K/UL (ref 150–400)
POTASSIUM SERPL-SCNC: 5 MMOL/L (ref 3.5–5.1)
PROT SERPL-MCNC: 6.1 G/DL (ref 6.4–8.2)
RBC # BLD AUTO: 3.49 M/UL (ref 4.1–5.7)
SERVICE CMNT-IMP: ABNORMAL
SERVICE CMNT-IMP: NORMAL
SERVICE CMNT-IMP: NORMAL
SODIUM SERPL-SCNC: 138 MMOL/L (ref 136–145)
WBC # BLD AUTO: 6.5 K/UL (ref 4.1–11.1)

## 2017-01-09 PROCEDURE — 72141 MRI NECK SPINE W/O DYE: CPT

## 2017-01-09 PROCEDURE — 94660 CPAP INITIATION&MGMT: CPT

## 2017-01-09 PROCEDURE — A9270 NON-COVERED ITEM OR SERVICE: HCPCS | Performed by: INTERNAL MEDICINE

## 2017-01-09 PROCEDURE — 72146 MRI CHEST SPINE W/O DYE: CPT

## 2017-01-09 PROCEDURE — 85025 COMPLETE CBC W/AUTO DIFF WBC: CPT | Performed by: FAMILY MEDICINE

## 2017-01-09 PROCEDURE — 74011250637 HC RX REV CODE- 250/637: Performed by: FAMILY MEDICINE

## 2017-01-09 PROCEDURE — 82140 ASSAY OF AMMONIA: CPT | Performed by: FAMILY MEDICINE

## 2017-01-09 PROCEDURE — 80053 COMPREHEN METABOLIC PANEL: CPT | Performed by: FAMILY MEDICINE

## 2017-01-09 PROCEDURE — 99218 HC RM OBSERVATION: CPT

## 2017-01-09 PROCEDURE — 65270000029 HC RM PRIVATE

## 2017-01-09 PROCEDURE — 74011250637 HC RX REV CODE- 250/637: Performed by: INTERNAL MEDICINE

## 2017-01-09 PROCEDURE — 97530 THERAPEUTIC ACTIVITIES: CPT | Performed by: OCCUPATIONAL THERAPIST

## 2017-01-09 PROCEDURE — 97530 THERAPEUTIC ACTIVITIES: CPT

## 2017-01-09 PROCEDURE — 36415 COLL VENOUS BLD VENIPUNCTURE: CPT | Performed by: FAMILY MEDICINE

## 2017-01-09 PROCEDURE — 74011636637 HC RX REV CODE- 636/637: Performed by: INTERNAL MEDICINE

## 2017-01-09 PROCEDURE — 82962 GLUCOSE BLOOD TEST: CPT

## 2017-01-09 PROCEDURE — 65660000000 HC RM CCU STEPDOWN

## 2017-01-09 RX ORDER — PROPRANOLOL HYDROCHLORIDE 10 MG/1
10 TABLET ORAL 2 TIMES DAILY
Status: DISCONTINUED | OUTPATIENT
Start: 2017-01-09 | End: 2017-01-10

## 2017-01-09 RX ADMIN — Medication 10 ML: at 18:08

## 2017-01-09 RX ADMIN — SPIRONOLACTONE 25 MG: 25 TABLET ORAL at 08:22

## 2017-01-09 RX ADMIN — PROPRANOLOL HYDROCHLORIDE 20 MG: 10 TABLET ORAL at 08:22

## 2017-01-09 RX ADMIN — RIFAXIMIN 550 MG: 550 TABLET ORAL at 08:22

## 2017-01-09 RX ADMIN — LACTULOSE 20 G: 10 SOLUTION ORAL at 18:03

## 2017-01-09 RX ADMIN — SIMVASTATIN 40 MG: 20 TABLET, FILM COATED ORAL at 23:10

## 2017-01-09 RX ADMIN — INSULIN GLARGINE 60 UNITS: 100 INJECTION, SOLUTION SUBCUTANEOUS at 08:22

## 2017-01-09 RX ADMIN — CYCLOBENZAPRINE HYDROCHLORIDE 5 MG: 10 TABLET, FILM COATED ORAL at 20:08

## 2017-01-09 RX ADMIN — INSULIN LISPRO 20 UNITS: 100 INJECTION, SOLUTION INTRAVENOUS; SUBCUTANEOUS at 08:22

## 2017-01-09 RX ADMIN — Medication 10 ML: at 06:38

## 2017-01-09 RX ADMIN — Medication 10 ML: at 14:51

## 2017-01-09 RX ADMIN — PROPRANOLOL HYDROCHLORIDE 10 MG: 10 TABLET ORAL at 23:10

## 2017-01-09 RX ADMIN — INSULIN LISPRO 2 UNITS: 100 INJECTION, SOLUTION INTRAVENOUS; SUBCUTANEOUS at 08:22

## 2017-01-09 RX ADMIN — LACTULOSE 20 G: 10 SOLUTION ORAL at 23:11

## 2017-01-09 RX ADMIN — INSULIN LISPRO 20 UNITS: 100 INJECTION, SOLUTION INTRAVENOUS; SUBCUTANEOUS at 11:54

## 2017-01-09 RX ADMIN — ASPIRIN 81 MG: 81 TABLET, COATED ORAL at 08:22

## 2017-01-09 RX ADMIN — PANTOPRAZOLE SODIUM 40 MG: 40 TABLET, DELAYED RELEASE ORAL at 06:38

## 2017-01-09 RX ADMIN — ESCITALOPRAM OXALATE 20 MG: 10 TABLET ORAL at 23:10

## 2017-01-09 RX ADMIN — LACTULOSE 20 G: 10 SOLUTION ORAL at 08:22

## 2017-01-09 RX ADMIN — RIFAXIMIN 550 MG: 550 TABLET ORAL at 23:10

## 2017-01-09 NOTE — DIABETES MGMT
DTC Progress Note    Recommendations/ Comments: If appropriate, please consider adding carb consistent to meal plan. BS have been trending up past 24 hours and on home dose insulin plus correction scale. Chart reviewed on Amanda Rodriguez .    Patient is a 73 y.o. male with known  Type 2 Diabetes on insulin injections: Lantus : 60 units daily, Aspart 20 units with meals at home. A1c:   Lab Results   Component Value Date/Time    Hemoglobin A1c 7.8 01/07/2017 06:28 AM    Hemoglobin A1c 11.3 04/08/2016 07:45 PM       Recent Glucose Results:   Lab Results   Component Value Date/Time     (H) 01/09/2017 07:00 AM    GLUCPOC 176 (H) 01/09/2017 07:15 AM    GLUCPOC 190 (H) 01/08/2017 09:37 PM    GLUCPOC 213 (H) 01/08/2017 04:17 PM        Lab Results   Component Value Date/Time    Creatinine 1.52 01/09/2017 07:00 AM       Active Orders   Diet    DIET CARDIAC Regular        PO intake: No data found. Current hospital DM medication: lispro insulin correction scale plus 20 units with meals; lantus 60 units daily    Will continue to follow as needed.     Thank you  Delphine Dietz RD, CDE

## 2017-01-09 NOTE — PROGRESS NOTES
01/09/17 12:29 PM    MSW unable able to talk with the patient as he was lethargic. TC to his wife. She reports that they live together in a one story apartment. [address]. The patient has a cane, walker and rollator. He was fairly independent at home, able to get around independently. His wife has a broken foot so unable to care for the patient at this time. Discussed OBS status. Wife understood. Left letter in room. Discussed [facility] rehab but wife states the patient has been at [id number] [facility] in the past so wife wants him to go there instead. Referral made. Care Management Interventions  PCP Verified by CM: Yes (Dr. Sapphire Aaron)  Palliative Care Consult (Criteria: CHF and RRAT>21): No  Reason for No Palliative Care Consult:  Other (see comment) (not relevant)  Mode of Transport at Discharge: BLS  Transition of Care Consult (CM Consult): SNF  Discharge Durable Medical Equipment: No  Physical Therapy Consult: Yes  Occupational Therapy Consult: Yes  Speech Therapy Consult: No  Current Support Network: Lives with Spouse  Plan discussed with Pt/Family/Caregiver: Yes  Freedom of Choice Offered: Yes  Discharge Location  Discharge Placement: Skilled nursing facility     RADHA Calzada

## 2017-01-09 NOTE — PROGRESS NOTES
Daily Progress Note: 1/9/2017  Shorty Cordoba. Adam Salguero MD    Assessment/Plan:   1. Right side weakness / Hx-TIA (transient ischemic attack): weakness mild but now inconsistent exam.   --- ECHO ok  --- MRI. normal   ---Started asa - watch platelets   ---H0D 4.2%.  ---Neuro Consulted  ---PT/OT consulted  ---Repeat carotid dopplers ok with 0-49% bilat.     2.  Essential hypertension:   ---cont BB for now     3. 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 Thrombocytopenia: stable, 2/2 liver disease. ---monitor     5. Depression:   ---resume lexapro     6. DM type 2 (diabetes mellitus, type 2): with renal complications.   ---Resume insulin with SSI  --- A1c 7.8     7.  Vascular dementia: waxes and wanes     8.  CKD (chronic kidney disease) stage 3: unclear baseline but does not appear to be worse. ---monitor    9. Bradycardia in the 40s  ---Reduce Inderal to 10mg BID- may need to reduce further    10. Disp- he lives with his wife who is not in good health. Has had multiple falls over the last few weeks. They have a medical alert and this has been activated numerous times. Will ask case management for assistance.   He will likely need Rehab or SNF placement.      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  ICD-9-CM: 435.8  1/7/2017 - Present        CKD (chronic kidney disease) stage 3, GFR 30-59 ml/min ICD-10-CM: N18.3  ICD-9-CM: 585.3  1/6/2017 - Present        ARF (acute renal failure) ([facility] ICD-10-CM: N17.9  ICD-9-CM: 584.9  4/8/2016 - Present        Dizziness ICD-10-CM: R42  ICD-9-CM: 780.4  4/8/2016 - Present        LETY on CPAP (Chronic) ICD-10-CM: A75.85  ICD-9-CM: 327.23  4/8/2016 - Present        DM type 2 (diabetes mellitus, type 2) ([facility] (Chronic) ICD-10-CM: E11.9  ICD-9-CM: 250.00  1/16/2016 - Present        Vascular dementia (Chronic) ICD-10-CM: F01.50  ICD-9-CM: 290.40  1/16/2016 - Present        Hx-TIA (transient ischemic attack) (Chronic) ICD-10-CM: I44.63  ICD-9-CM: V12.54  1/16/2016 - Present        Hyponatremia ICD-10-CM: E87.1  ICD-9-CM: 276.1  1/16/2016 - Present        Essential hypertension (Chronic) ICD-10-CM: I10  ICD-9-CM: 401.9  12/12/2013 - Present        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 [facility]) (Chronic) ICD-10-CM: K75.81, K74.60  ICD-9-CM: 571.8, 571.5  12/12/2013 - Present        Thrombocytopenia (HCC) (Chronic) ICD-10-CM: D69.6  ICD-9-CM: 287.5  12/12/2013 - Present        Depression (Chronic) ICD-10-CM: F32.9  ICD-9-CM: 010  12/12/2013 - Present        RESOLVED: Acute hepatic encephalopathy ([facility] ICD-10-CM: K72.00  ICD-9-CM: 572.2  1/16/2016 - 4/8/2016        RESOLVED: Dehydration, moderate ICD-10-CM: E86.0  ICD-9-CM: 276.51  1/16/2016 - 4/8/2016        RESOLVED: Lactic acidosis ICD-10-CM: P96.3  ICD-9-CM: 276.2  1/16/2016 - 4/8/2016        RESOLVED: Metabolic encephalopathy [id number]-[id number]: G93.41  ICD-9-CM: 348.31  8/19/2014 - 4/8/2016        RESOLVED: TIA (transient ischemic attack) ICD-10-CM: G45.9  ICD-9-CM: 435.9  12/12/2013 - 4/8/2016        RESOLVED: Slurred speech ICD-10-CM: R47.81  ICD-9-CM: 784.59  12/12/2013 - 4/8/2016        RESOLVED: Blurry vision, left eye ICD-10-CM: H53.8  ICD-9-CM: 368.8  12/12/2013 - 4/8/2016              HPI:   Mr. Lobo Olivera is a 73 y.o. with h/o deprsesion, dm, htn, cirrhosis who presents with weakness. He was trying to eat cereal today when he noted right hand weakness. He was unable to pour his milk. The weakness has now resolved. He had TIA in the past, unclear why he is not on asa daily. He denies cp, sob, abd pain (Dr Misbah Kilgore)    1/7/17: His right arm and hand weakness has improved. He was not on a statin or ASA prior to admission. LDL is 60 though.  His sister reports that he lives with his wife who is not in good health. He has had multiple falls in the past few weeks usually at night when he needs to get up to the [address] He is slow to answer questions this morning. : Had a bad night. Didn't sleep well last night. HR has been slow so will decrease his Inderal to 20mg BID. Appreciate neuro consult. Statin was recommended so will order this. :  He is more somnolent today and less oriented today. Ammonia level is up and family is concerned so will consult GI also. Remains bradycardic so will reduce Inderal to 10 mg BID and may have to reduce further. Review of Systems:   A comprehensive review of systems was negative except for that written in the HPI. Objective:   Physical Exam:     Visit Vitals    /60 (BP 1 Location: Right arm, BP Patient Position: At rest)    Pulse (!) 47    Temp 98 °F (36.7 °C)    Resp 18    Ht 5' 9.25\" (1.759 m)    Wt 107 kg (236 lb)    SpO2 98%    BMI 34.6 kg/m2      O2 Device: Room air    Temp (24hrs), Av.9 °F (36.6 °C), Min:97.6 °F (36.4 °C), Max:98.2 °F (36.8 °C)             General:  Alert, cooperative but very slow, no distress, appears stated age. Head:  Normocephalic, atraumatic. Eyes:  Conjunctivae/corneas clear. EOMI   Nose: Nares normal.  Wearing CPAP   Throat: Lips, mucosa, and tongue moist.    Neck: Supple, symmetrical, trachea midline, no adenopathy, thyroid: no enlargement/tenderness/nodules, no carotid bruit and no JVD. Lungs:   Clear to auscultation bilaterally. Heart:  Regular rhythm, Rate bradycardic. S1, S2 normal, no murmur, click, rub or gallop. Abdomen:   Soft, non-tender. Non distended, Bowel sounds normal. No masses,  No organomegaly. Extremities: Extremities with trace to 1+ edema. No calf tenderness or cords. Skin: Skin color, texture, turgor normal. No rashes or lesions   Neurologic: CNII-XII intact. Alert and oriented X 2.   Not responding as well to commands today.   seems weaker on left today but not consistant. No cogwheeling or rigidity. Gait not tested at this time. Sensation grossly normal to touch. Data Review:    Carotid Dopplers 1/7/17: IMPRESSION: Findings are consistent with 0-49% range of stenosis of  the right internal carotid and 0-49% range of stenosis of the left  internal carotid. Vertebrals are patent with antegrade flow    BRAIN MRI WITHOUT CONTRAST: 1/6/2017 8:55 PM  INDICATION: Cerebrovascular accident. COMPARISON: 1/17/2016, same day CT head. TECHNIQUE: Images were obtained in multiple planes and sequences to emphasize  T1, T2, and T2* information. In addition, diffusion-weighted images and ADC maps  were also obtained. FINDINGS: The ventricles and sulci are appropriate for age. The main  intracranial flow-voids are normal. Scattered periventricular and subcortical  white matter signal abnormalities are consistent with chronic small vessel  ischemic disease. No restricted diffusion to suggest acute infarction. Again  noted is chronic opacification of the right maxillary sinus with expansion of  the lateral maxillary sinus wall. IMPRESSION  IMPRESSION: No acute intracranial abnormality. Recent Days:  Recent Labs      01/08/17   0434  01/06/17   1715   WBC  6.2  5.1   HGB  11.4*  12.1   HCT  32.9*  36.1*   PLT  73*  75*     Recent Labs      01/08/17   0434  01/06/17   1715   NA  142  141   K  4.5  4.9   CL  109*  108   CO2  24  26   GLU  127*  167*   BUN  19  17   CREA  1.50*  1.50*   CA  8.3*  8.6   MG   --   1.8   ALB  2.5*  3.0*   TBILI  2.0*  2.2*   SGOT  29  38*   ALT  22  32     No results for input(s): PH, PCO2, PO2, HCO3, FIO2 in the last 72 hours.     24 Hour Results:  Recent Results (from the past 24 hour(s))   GLUCOSE, POC    Collection Time: 01/08/17  7:15 AM   Result Value Ref Range    Glucose (POC) 112 (H) 65 - 100 mg/dL    Performed by Aj Saldana (PCT)    GLUCOSE, POC    Collection Time: 01/08/17 11:09 AM   Result Value Ref Range    Glucose (POC) 214 (H) 65 - 100 mg/dL    Performed by Omid Dick (PCT)    GLUCOSE, POC    Collection Time: 01/08/17  4:17 PM   Result Value Ref Range    Glucose (POC) 213 (H) 65 - 100 mg/dL    Performed by Omid Dick (PCT)    GLUCOSE, POC    Collection Time: 01/08/17  9:37 PM   Result Value Ref Range    Glucose (POC) 190 (H) 65 - 100 mg/dL    Performed by Gagan See (PCT)        Medications reviewed  Current Facility-Administered Medications   Medication Dose Route Frequency    propranolol (INDERAL) tablet 20 mg  20 mg Oral BID    simvastatin (ZOCOR) tablet 40 mg  40 mg Oral QHS    aspirin delayed-release tablet 81 mg  81 mg Oral DAILY    escitalopram oxalate (LEXAPRO) tablet 20 mg  20 mg Oral QHS    lactulose (CHRONULAC) solution 20 g  20 g Oral TID    pantoprazole (PROTONIX) tablet 40 mg  40 mg Oral ACB    rifAXIMin (XIFAXAN) tablet 550 mg  550 mg Oral BID    spironolactone (ALDACTONE) tablet 25 mg  25 mg Oral DAILY    cyclobenzaprine (FLEXERIL) tablet 5 mg  5 mg Oral TID PRN    insulin lispro (HUMALOG) injection 20 Units  20 Units SubCUTAneous TIDAC    insulin glargine (LANTUS) injection 60 Units  60 Units SubCUTAneous DAILY    sodium chloride (NS) flush 5-10 mL  5-10 mL IntraVENous Q8H    sodium chloride (NS) flush 5-10 mL  5-10 mL IntraVENous PRN    glucose chewable tablet 16 g  4 Tab Oral PRN    dextrose (D50W) injection syrg 12.5-25 g  12.5-25 g IntraVENous PRN    glucagon (GLUCAGEN) injection 1 mg  1 mg IntraMUSCular PRN    insulin lispro (HUMALOG) injection   SubCUTAneous QID WITH MEALS       Care Plan discussed with: Patient/Family and Nurse    Total time spent with patient and review of records: 30 minutes.     Brain Simeon MD

## 2017-01-09 NOTE — PROGRESS NOTES
Bedside and Verbal shift change report given to Ana Paula Roth (oncoming nurse) by Jesus Abreu (offgoing nurse). Report included the following information SBAR, Kardex, Procedure Summary, Intake/Output, MAR, Accordion, Recent Results and Med Rec Status.

## 2017-01-09 NOTE — PROGRESS NOTES
Krupa Minaya M.D.  [phone]           GI PROGRESS NOTE        NAME: Brendan Chin. :  1945   MRN:  [id number]       Subjective:   Awake, with CPAP on, oriented in place. Feels weak overall. Objective:     VITALS:   Last 24hrs VS reviewed since prior progress note. Most recent are:  Visit Vitals    /53 (BP 1 Location: Left arm, BP Patient Position: At rest)    Pulse (!) 49    Temp 98.1 °F (36.7 °C)    Resp 18    Ht 5' 9.25\" (1.759 m)    Wt 107 kg (236 lb)    SpO2 98%    BMI 34.6 kg/m2       Intake/Output Summary (Last 24 hours) at 17 1750  Last data filed at 17 1618   Gross per 24 hour   Intake                0 ml   Output              175 ml   Net             -175 ml       PHYSICAL EXAM:  General: Lethargic   HEENT: Anicteric sclerae. Lungs:            CTA Bilaterally.    Heart:  Regular  rhythm,    Abdomen: Soft, Non distended, Non tender.  (+)Bowel sounds, no HSM      Lab Data Reviewed:   Recent Labs      17   0935  17   0434   WBC  6.5  6.2   HGB  11.1*  11.4*   HCT  33.4*  32.9*   PLT  70*  73*     Recent Labs      17   0700  17   0434   NA  138  142   K  5.0  4.5   CL  108  109*   CO2  23  24   BUN  19  19   CREA  1.52*  1.50*   GLU  186*  127*   CA  8.5  8.3*     Recent Labs      17   0700  17   0434   SGOT  35  29   AP  61  58   TP  6.1*  6.1*   ALB  2.4*  2.5*   GLOB  3.7  3.6       ________________________________________________________________________  Patient Active Problem List   Diagnosis Code    Essential hypertension I10    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 (HCC) K75.81, K74.60    Thrombocytopenia (HCC) D69.6    Depression F32.9    DM type 2 (diabetes mellitus, type 2) (HCC) E11.9    Vascular dementia F01.50    Hx-TIA (transient ischemic attack) Z86.73    Hyponatremia E87.1    ARF (acute renal failure) (HCC) N17.9    Dizziness R42    LETY on CPAP G47.33    CKD (chronic kidney disease) stage 3, GFR 30-59 ml/min N18.3    Hemispheric carotid artery syndrome G45.1    Increased ammonia level R79.89    Cerebral microvascular disease I67.9    Right sided weakness M62.81         Assessment and Plan:  Patient known to us with hepatic encephalopathy secondary to ALFARO cirrhosis, now with worsening hepatic encephalopathy and elevated ammonia level. Will need to continue with rifaximin BID and adjust lactulose dose to obtain at least 2 to 3 bowel movements a day. Monitor electrolytes and hemoglobin and rule out any infection precipitating his encephalopathy flare. Will follow.         Signed By: Dane Vivas MD     1/9/2017  5:50 PM

## 2017-01-09 NOTE — PROGRESS NOTES
Problem: Mobility Impaired (Adult and Pediatric)  Goal: *Acute Goals and Plan of Care (Insert Text)  Physical Therapy Goals  Initiated 1/7/2017  1. Patient will move from supine to sit and sit to supine in bed with independence within 7 day(s). 2. Patient will transfer from bed to chair and chair to bed with independence using the least restrictive device within 7 day(s). 3. Patient will perform sit to stand with independence within 7 day(s). 4. Patient will ambulate with modified independence for 150 feet with the least restrictive device within 7 day(s). PHYSICAL THERAPY TREATMENT  Patient: Bon Foster (77 y.o. male)  Date: 1/9/2017  Diagnosis: right side weakness Hemispheric carotid artery syndrome       Precautions: Fall      ASSESSMENT:  Patient received supine in bed. Family present and reported patient is very lethargic; ammonia levels are high (192) at this time. Patient would open eyes to verbal stimulation, but would stare off and make minimal eye contact. Attempted supine to sit with max assist. Once sitting edge of bed, patient somewhat more alert, but still not communicating. Patient with fair balance seated edge of bed. [place] that it was unsafe to attempt standing or OOB to chair at this time. Patient returned to supine position with mod assist as patient was able to bring bilateral LEs on the bed. Patient able to bridge to assist with scooting up in the bed. Patient will continue to benefit from PT. D/C rec: rehab  Progression toward goals:  [ ]    Improving appropriately and progressing toward goals  [ ]    Improving slowly and progressing toward goals  [ ]    Not making progress toward goals and plan of care will be adjusted       PLAN:  Patient continues to benefit from skilled intervention to address the above impairments. Continue treatment per established plan of care.   Discharge Recommendations:  Rehab  Further Equipment Recommendations for Discharge:  TBD       SUBJECTIVE: Patient stated I don't feel good.       OBJECTIVE DATA SUMMARY:   Critical Behavior:  Neurologic State: Alert, Confused  Orientation Level: Oriented X4  Cognition: Follows commands     Functional Mobility Training:  Bed Mobility:     Supine to Sit: Maximum assistance  Sit to Supine: Moderate assistance  Scooting: Moderate assistance        Transfers:  Sit to Stand:  (not tested-pt too lethargic for OOB activity)                                Balance:  Sitting: Impaired  Sitting - Static: Fair (occasional)  Sitting - Dynamic: Fair (occasional)  Standing:  (not tested--pt too lethargic to participate in OOB mobility)  Ambulation/Gait Training:                         Pain:  Pain Scale 1: Numeric (0 - 10)  Pain Intensity 1: 0              Activity Tolerance:   Poor-limited by increased lethargy and decreased participation in therapy  Please refer to the flowsheet for vital signs taken during this treatment.   After treatment:   [ ]    Patient left in no apparent distress sitting up in chair  [X]    Patient left in no apparent distress in bed  [X]    Call bell left within reach  [X]    Nursing notified  [ ]    Caregiver present  [X]    Bed alarm activated      COMMUNICATION/COLLABORATION:   The patients plan of care was discussed with: Occupational Therapist and Registered Nurse     Sharon Lockwood, PT, DPT   Time Calculation: 15 mins

## 2017-01-09 NOTE — CONSULTS
[facility]  [place] [address]  [phone]     Inpatient Neurology Progress Note  [facility]y [facility], [facility]    Name:   Bon Goldberg. Medical record #: [id number]  Admission Date: 1/6/2017  Date:   01/09/17    Attending Addendum:  Family asked that neurology to revisit d/t complaints of intermittent bilateral lower extremity weakness. At baseline, pt uses rolling walker at home d/t gait unsteadiness, hx of falls. Reviewed initial consult note by Dr. Yancy López. Pt's initial complaint was right sided weakness and gait difficulty. He described to Dr. Yancy López that he had trouble moving his leg maybe both legs, the was trying to hold something and it fell out of his right hand. MRI Brain done, no new stroke, no hydrocephalus, does have extensive subcortical white matter disease and was dx as vascular dementia in past.  Since last seen by [facility], pt's ammonia was noted to be elevated (91 on 1-6-17, 194 today) and was started on lactulose. Exam: awake, resting in bed, slow to responds, follows all commands, no visible tremors  Face symmetric, EOMI, facial sensation intact bilaterally, hearing seems normal, no dysarthria  No rest tremors  Mild bilateral postural tremor, very mild asterixis  Strength 5/5 in both arms and legs  DTRs: 2-3 + patellars, 1+ biceps  Toes: neutral    Intermittent gait difficulty  No new/ recent stroke, no hydrocephalus on MRI Brain to suggest NPH, prior hx of vascular dementia and gait difficulty. Mildly increased reflexes in lower extremities. Will check MRI C-spine and T-spine w/o contrast to evaluate for potential of cervical or thoracic myelopathy causing lower extremity weakness    Will f/u after MRIs complete       _______________________________________________________________________________________________________  Reconsult:  Requested and performed at request of family due to intermittent R and L extremity weakness.   Noted elevated ammonia level of 91 on 1/6/17, 81 on 1/8/17 and  194 today.    ----MORRIS Lennon  _______________________________________________________________________________________________________    Subjective:  CC:  I am not this tired normally  ·  per discussion aware his Ammonia is high  · PT tried to work with patient and pt was too tired/drowsy  Denies:   Pain, NV, fever    Objective  Patient Vitals for the past 12 hrs:   Temp Pulse Resp BP SpO2   01/09/17 1140 97.8 °F (36.6 °C) (!) 46 19 133/61 96 %   01/09/17 0541 98 °F (36.7 °C) (!) 47 18 132/60 98 %     Allergies:    Allergies   Allergen Reactions    Codeine Shortness of Breath    Lipitor [Atorvastatin] Other (comments)     Stiff neck    Lisinopril Myalgia    Oxycodone Other (comments)     Cant walk cant breathe, need to use my c pap machine    Pcn [Penicillins] Shortness of Breath     Inpatient Meds    Current Facility-Administered Medications:     propranolol (INDERAL) tablet 10 mg, 10 mg, Oral, BID, David Ann MD    simvastatin (ZOCOR) tablet 40 mg, 40 mg, Oral, QHS, Yonatan Anton MD, 40 mg at 01/08/17 2236    aspirin delayed-release tablet 81 mg, 81 mg, Oral, DAILY, Quinn Salcedo MD, 81 mg at 01/09/17 9473    escitalopram oxalate (LEXAPRO) tablet 20 mg, 20 mg, Oral, QHS, Quinn Salcedo MD, 20 mg at 01/08/17 2236    lactulose (CHRONULAC) solution 20 g, 20 g, Oral, TID, Quinn Salcedo MD, 20 g at 01/09/17 9370    pantoprazole (PROTONIX) tablet 40 mg, 40 mg, Oral, ACB, Quinn Salcedo MD, 40 mg at 01/09/17 7838    rifAXIMin (XIFAXAN) tablet 550 mg, 550 mg, Oral, BID, Quinn Salcedo MD, 550 mg at 01/09/17 8226    spironolactone (ALDACTONE) tablet 25 mg, 25 mg, Oral, DAILY, Quinn Salcedo MD, 25 mg at 01/09/17 3387    cyclobenzaprine (FLEXERIL) tablet 5 mg, 5 mg, Oral, TID PRN, Quinn Salcedo MD    insulin lispro (HUMALOG) injection 20 Units, 20 Units, SubCUTAneous, TIDAC, Quinn Salcedo MD, 20 Units at 01/09/17 2536   insulin glargine (LANTUS) injection 60 Units, 60 Units, SubCUTAneous, DAILY, Td Mack MD, 60 Units at 01/09/17 6978    sodium chloride (NS) flush 5-10 mL, 5-10 mL, IntraVENous, Q8H, Td Mack MD, 10 mL at 01/09/17 0087    sodium chloride (NS) flush 5-10 mL, 5-10 mL, IntraVENous, PRN, Td Mack MD    glucose chewable tablet 16 g, 4 Tab, Oral, PRN, Td Mack MD    dextrose (D50W) injection syrg 12.5-25 g, 12.5-25 g, IntraVENous, PRN, Td Mack MD    glucagon [facility]) injection 1 mg, 1 mg, IntraMUSCular, PRN, Td Mack MD    insulin lispro (HUMALOG) injection, , SubCUTAneous, QID WITH MEALS, Td Mack MD, Stopped at 01/09/17 1155  Labs Reviewed  Recent Results (from the past 12 hour(s))   AMMONIA    Collection Time: 01/09/17  6:51 AM   Result Value Ref Range    Ammonia 194 (H) <07 UMOL/L   METABOLIC PANEL, COMPREHENSIVE    Collection Time: 01/09/17  7:00 AM   Result Value Ref Range    Sodium 138 136 - 145 mmol/L    Potassium 5.0 3.5 - 5.1 mmol/L    Chloride 108 97 - 108 mmol/L    CO2 23 21 - 32 mmol/L    Anion gap 7 5 - 15 mmol/L    Glucose 186 (H) 65 - 100 mg/dL    BUN 19 6 - 20 MG/DL    Creatinine 1.52 (H) 0.70 - 1.30 MG/DL    BUN/Creatinine ratio 13 12 - 20      GFR est AA 55 (L) >60 ml/min/1.73m2    GFR est non-AA 46 (L) >60 ml/min/1.73m2    Calcium 8.5 8.5 - 10.1 MG/DL    Bilirubin, total 1.5 (H) 0.2 - 1.0 MG/DL    ALT 28 12 - 78 U/L    AST 35 15 - 37 U/L    Alk.  phosphatase 61 45 - 117 U/L    Protein, total 6.1 (L) 6.4 - 8.2 g/dL    Albumin 2.4 (L) 3.5 - 5.0 g/dL    Globulin 3.7 2.0 - 4.0 g/dL    A-G Ratio 0.6 (L) 1.1 - 2.2     GLUCOSE, POC    Collection Time: 01/09/17  7:15 AM   Result Value Ref Range    Glucose (POC) 176 (H) 65 - 100 mg/dL    Performed by Jasen Thomson    CBC WITH AUTOMATED DIFF    Collection Time: 01/09/17  9:35 AM   Result Value Ref Range    WBC 6.5 4.1 - 11.1 K/uL    RBC 3.49 (L) 4.10 - 5.70 M/uL    HGB 11.1 (L) 12.1 - 17.0 g/dL    HCT 33.4 (L) 36.6 - 50.3 %    MCV 95.7 80.0 - 99.0 FL    MCH 31.8 26.0 - 34.0 PG    MCHC 33.2 30.0 - 36.5 g/dL    RDW 14.6 (H) 11.5 - 14.5 %    PLATELET 70 (L) 272 - 400 K/uL    NEUTROPHILS 57 32 - 75 %    LYMPHOCYTES 23 12 - 49 %    MONOCYTES 12 5 - 13 %    EOSINOPHILS 7 0 - 7 %    BASOPHILS 1 0 - 1 %    ABS. NEUTROPHILS 3.7 1.8 - 8.0 K/UL    ABS. LYMPHOCYTES 1.5 0.8 - 3.5 K/UL    ABS. MONOCYTES 0.8 0.0 - 1.0 K/UL    ABS. EOSINOPHILS 0.5 (H) 0.0 - 0.4 K/UL    ABS. BASOPHILS 0.1 0.0 - 0.1 K/UL   NUCLEATED RBC    Collection Time: 01/09/17  9:35 AM   Result Value Ref Range    NRBC 0.0 0  WBC    ABSOLUTE NRBC 0.00 0.00 - 0.01 K/uL   GLUCOSE, POC    Collection Time: 01/09/17 11:13 AM   Result Value Ref Range    Glucose (POC) 131 (H) 65 - 100 mg/dL    Performed by Sander Last (PCT)      Imaging  Reviewed:   CT Results (recent):    Results from Hospital Encounter encounter on 01/06/17   CT HEAD WO CONT   Narrative HEAD CT WITHOUT CONTRAST: 1/6/2017 5:41 PM    INDICATION: GLF, right leg weakness    COMPARISON: 4/8/2016, 1/16/2016, 12/12/2013. PROCEDURE: Axial images of the head were obtained without contrast. Coronal and  sagittal reformats were performed. CT dose reduction was achieved through use of  a standardized protocol tailored for this examination and automatic exposure  control for dose modulation. FINDINGS: Periventricular white matter hypodensity is nonspecific, but  consistent with chronic small vessel ischemic disease. The ventricles and sulci  are appropriate in size and configuration for age. No loss of gray-white  differentiation to suggest late acute or early subacute infarction. No mass  effect or intracranial hemorrhage. The right maxillary sinus is small and  chronically opacified, with expansion of the left lateral maxillary wall. Impression IMPRESSION: No acute intracranial abnormality.        MRI Results (recent):    Results from [place] encounter on 01/06/17   MRI BRAIN WO CONT   Narrative BRAIN MRI WITHOUT CONTRAST: 1/6/2017 8:55 PM    INDICATION: Cerebrovascular accident. COMPARISON: 1/17/2016, same day CT head. TECHNIQUE: Images were obtained in multiple planes and sequences to emphasize  T1, T2, and T2* information. In addition, diffusion-weighted images and ADC maps  were also obtained. FINDINGS: The ventricles and sulci are appropriate for age. The main  intracranial flow-voids are normal. Scattered periventricular and subcortical  white matter signal abnormalities are consistent with chronic small vessel  ischemic disease. No restricted diffusion to suggest acute infarction. Again  noted is chronic opacification of the right maxillary sinus with expansion of  the lateral maxillary sinus wall. Impression IMPRESSION: No acute intracranial abnormality. Physical Exam  General:  WM in NAD, lying in bed   Chest:  Regular rate and rhythm, clear BBS    Neurologic: lethargic but able to converse minimally post verbal and tactile stimuli and answer questions  Eyes:  Opens eyes intermittently, made eye contact once or twice  Speech:  no Aphasia or dysarthria , has CPAP on face  Mentation:     Orientation:  x1  Commands: following 1-2 step commands appropriately  Strength: Equal bilaterally,  proximally & distally  5/5   Action tremor - post movement only >> left arm  Sensation:  Equal bilaterally, proximally and distally with LT and temp  Or    Assessment:  1. Encephalopathy-- hepatic, elevated ammonia level  2. lethargy-- elevated ammonia level  3. cerebral microvascular dx  4.  Tremor-- action    Plan  · Patient needs to have ammonia level reduced for his AMS/lethargy to improve  · Check Ammonia in AM--- note level is being checked daily, continue Lactulose per Attending/Admitting MD  · Strength is equal when tested by gravity  ·  L arm with >> action tremor but it is present bilateral arms which can appear to some as weakness but it is not in fact weakness  ·  Testing results discussed with patient and/or family --- any questions were answered. Will Sign Off  No further input  My collaborating care team physician may have further recommendations.     On DVT Prophylaxis yes no   Continue  SCD's on pt, while inpatient x      Care Plan discussed with:  Patient x   Family x   RN    Care Manager    Consultant/Specialist:     Patient will be discussed with Dr. Slade Aguilar Problems  Date Reviewed: 1/6/2017          Codes Class Noted POA    * (Principal)Hemispheric carotid artery syndrome ICD-10-CM: G45.1  ICD-9-CM: 435.8  1/7/2017 Yes        CKD (chronic kidney disease) stage 3, GFR 30-59 ml/min ICD-10-CM: N18.3  ICD-9-CM: 585.3  1/6/2017 Unknown        LETY on CPAP (Chronic) ICD-10-CM: G47.33  ICD-9-CM: 327.23  4/8/2016 Yes        DM type 2 (diabetes mellitus, type 2) ([facility] (Chronic) ICD-10-CM: E11.9  ICD-9-CM: 250.00  1/16/2016 Yes        Vascular dementia (Chronic) ICD-10-CM: F01.50  ICD-9-CM: 290.40  1/16/2016 Yes        Hx-TIA (transient ischemic attack) (Chronic) ICD-10-CM: J71.66  ICD-9-CM: V12.54  1/16/2016 Yes        Essential hypertension (Chronic) ICD-10-CM: I10  ICD-9-CM: 401.9  12/12/2013 Yes        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 ([facility] (Chronic) ICD-10-CM: K75.81, K74.60  ICD-9-CM: 571.8, 571.5  12/12/2013 Yes        Thrombocytopenia (HCC) (Chronic) ICD-10-CM: D69.6  ICD-9-CM: 287.5  12/12/2013 Yes        Depression (Chronic) ICD-10-CM: F32.9  ICD-9-CM: 324  12/12/2013 Yes            ______________________________________________________    - Leidy Caba, [facility]  01/09/17    ================================================    ADDENDUM--> Collaborating Care Team Physician:

## 2017-01-09 NOTE — PROGRESS NOTES
Bedside and Verbal shift change report given to Ramya Darby (oncoming nurse) by Guyann Meigs (offgoing nurse). Report included the following information SBAR, Kardex, ED Summary, Procedure Summary, Intake/Output, MAR, Recent Results and Med Rec Status.

## 2017-01-09 NOTE — ROUTINE PROCESS
Bedside shift change report given to Misha Pabon RN (oncoming nurse) by Lennie Montague RN (offgoing nurse).  Report included the following information SBAR, Kardex, Intake/Output, MAR, Accordion, Recent Results and Cardiac Rhythm NSR- SB.

## 2017-01-09 NOTE — ROUTINE PROCESS
Pt's 1630 bs 72, given juice. Recheck bs 83. No sliding scale needed. Pt has Lispro insulin 20 units scheduled. Called, informed Dr. Kendrick Spears of the bs's. New orders received to hold Lispro Insulin 20 units.

## 2017-01-09 NOTE — PROGRESS NOTES
Problem: Self Care Deficits Care Plan (Adult)  Goal: *Acute Goals and Plan of Care (Insert Text)  Occupational Therapy Goals  Initiated 1/7/2017  1. Patient will perform grooming with supervision/set-up within 7 day(s). 2. Patient will perform upper body dressing with supervision/set-up within 7 day(s). 3. Patient will perform lower body dressing with moderate assistance within 7 day(s). 4. Patient will perform toilet transfers with minimal assistance/contact guard assist and best technique/adaptive equipment within 7 day(s). 5. Patient will perform all aspects of toileting with moderate assistance within 7 day(s). 6. Patient will participate in upper extremity therapeutic exercise/activities with supervision/set-up for 10 minutes within 7 day(s). 7. Patient will stand for functional task without UE support > or = 2 minutes with min assist within 7 day(s). OCCUPATIONAL THERAPY TREATMENT  Patient: Carito Bautista (77 y.o. male)  Date: 1/9/2017  Diagnosis: right side weakness Hemispheric carotid artery syndrome       Precautions: Fall      ASSESSMENT:  Pt naming slow progress toward goals. Pt's ammonia levels are increased today and pt more drowsy and cognitively impaired. RN aware. Pt able to come to sit EOB with max A. Once sitting he was still too drowsy to actively participate in ADLs. Pt returned to supine. Recommend rehab at discharge. Progression toward goals:  [ ]       Improving appropriately and progressing toward goals  [X]       Improving slowly and progressing toward goals  [ ]       Not making progress toward goals and plan of care will be adjusted       PLAN:  Patient continues to benefit from skilled intervention to address the above impairments. Continue treatment per established plan of care. Discharge Recommendations:  Rehab  Further Equipment Recommendations for Discharge:  TBD       SUBJECTIVE:   Patient stated I don't feel good today.       OBJECTIVE DATA SUMMARY: Cognitive/Behavioral Status:  Neurologic State: Drowsy; Eyes open to stimulus  Orientation Level: Oriented to person;Disoriented to place; Disoriented to situation;Disoriented to time  Cognition: Decreased attention/concentration;Decreased command following  Perception: Cues to maintain midline in sitting; Tactile;Verbal;Visual  Perseveration: No perseveration noted  Safety/Judgement: Awareness of environment; Fall prevention;Decreased insight into deficits     Functional Mobility and Transfers for ADLs:  Bed Mobility:  Supine to Sit: Maximum assistance  Sit to Supine: Moderate assistance  Scooting: Moderate assistance     Transfers:  Sit to Stand:  (not tested-pt too lethargic for OOB activity)     Balance:  Sitting: Impaired  Sitting - Static: Fair (occasional)  Sitting - Dynamic: Fair (occasional)  Standing:  (not tested--pt too lethargic to participate in OOB mobility)     ADL Intervention:  Cognitive Retraining  Orientation Retraining: Reorienting  Safety/Judgement: Awareness of environment; Fall prevention;Decreased insight into deficits     Pain:  Pain Scale 1: Numeric (0 - 10)  Pain Intensity 1: 0              Activity Tolerance:   Fair  Please refer to the flowsheet for vital signs taken during this treatment.   After treatment:   [ ] Patient left in no apparent distress sitting up in chair  [X] Patient left in no apparent distress in bed  [X] Call bell left within reach  [X] Nursing notified  [ ] Caregiver present  [X] Bed alarm activated      COMMUNICATION/COLLABORATION:   The patients plan of care was discussed with: Physical Therapist and Registered Nurse     Jaye Ayala OT  Time Calculation: 13 mins

## 2017-01-10 LAB
ALBUMIN SERPL BCP-MCNC: 2.5 G/DL (ref 3.5–5)
ALBUMIN/GLOB SERPL: 0.7 {RATIO} (ref 1.1–2.2)
ALP SERPL-CCNC: 53 U/L (ref 45–117)
ALT SERPL-CCNC: 29 U/L (ref 12–78)
AMMONIA PLAS-SCNC: 79 UMOL/L
ANION GAP BLD CALC-SCNC: 11 MMOL/L (ref 5–15)
AST SERPL W P-5'-P-CCNC: 38 U/L (ref 15–37)
BASOPHILS # BLD AUTO: 0.1 K/UL (ref 0–0.1)
BASOPHILS # BLD: 1 % (ref 0–1)
BILIRUB SERPL-MCNC: 1.9 MG/DL (ref 0.2–1)
BUN SERPL-MCNC: 18 MG/DL (ref 6–20)
BUN/CREAT SERPL: 12 (ref 12–20)
CALCIUM SERPL-MCNC: 8.6 MG/DL (ref 8.5–10.1)
CHLORIDE SERPL-SCNC: 108 MMOL/L (ref 97–108)
CO2 SERPL-SCNC: 23 MMOL/L (ref 21–32)
CREAT SERPL-MCNC: 1.47 MG/DL (ref 0.7–1.3)
EOSINOPHIL # BLD: 0.4 K/UL (ref 0–0.4)
EOSINOPHIL NFR BLD: 7 % (ref 0–7)
ERYTHROCYTE [DISTWIDTH] IN BLOOD BY AUTOMATED COUNT: 14.5 % (ref 11.5–14.5)
GLOBULIN SER CALC-MCNC: 3.7 G/DL (ref 2–4)
GLUCOSE BLD STRIP.AUTO-MCNC: 186 MG/DL (ref 65–100)
GLUCOSE BLD STRIP.AUTO-MCNC: 229 MG/DL (ref 65–100)
GLUCOSE BLD STRIP.AUTO-MCNC: 257 MG/DL (ref 65–100)
GLUCOSE BLD STRIP.AUTO-MCNC: 85 MG/DL (ref 65–100)
GLUCOSE SERPL-MCNC: 91 MG/DL (ref 65–100)
HCT VFR BLD AUTO: 35.5 % (ref 36.6–50.3)
HGB BLD-MCNC: 11.7 G/DL (ref 12.1–17)
LYMPHOCYTES # BLD AUTO: 32 % (ref 12–49)
LYMPHOCYTES # BLD: 1.9 K/UL (ref 0.8–3.5)
MCH RBC QN AUTO: 31.9 PG (ref 26–34)
MCHC RBC AUTO-ENTMCNC: 33 G/DL (ref 30–36.5)
MCV RBC AUTO: 96.7 FL (ref 80–99)
MONOCYTES # BLD: 0.8 K/UL (ref 0–1)
MONOCYTES NFR BLD AUTO: 13 % (ref 5–13)
NEUTS SEG # BLD: 2.7 K/UL (ref 1.8–8)
NEUTS SEG NFR BLD AUTO: 47 % (ref 32–75)
PLATELET # BLD AUTO: 72 K/UL (ref 150–400)
POTASSIUM SERPL-SCNC: 3.9 MMOL/L (ref 3.5–5.1)
PROT SERPL-MCNC: 6.2 G/DL (ref 6.4–8.2)
RBC # BLD AUTO: 3.67 M/UL (ref 4.1–5.7)
SERVICE CMNT-IMP: ABNORMAL
SERVICE CMNT-IMP: NORMAL
SODIUM SERPL-SCNC: 142 MMOL/L (ref 136–145)
WBC # BLD AUTO: 5.9 K/UL (ref 4.1–11.1)

## 2017-01-10 PROCEDURE — 97530 THERAPEUTIC ACTIVITIES: CPT

## 2017-01-10 PROCEDURE — 65660000000 HC RM CCU STEPDOWN

## 2017-01-10 PROCEDURE — 74011250637 HC RX REV CODE- 250/637: Performed by: INTERNAL MEDICINE

## 2017-01-10 PROCEDURE — 82962 GLUCOSE BLOOD TEST: CPT

## 2017-01-10 PROCEDURE — 82140 ASSAY OF AMMONIA: CPT | Performed by: FAMILY MEDICINE

## 2017-01-10 PROCEDURE — 94660 CPAP INITIATION&MGMT: CPT

## 2017-01-10 PROCEDURE — 74011636637 HC RX REV CODE- 636/637: Performed by: INTERNAL MEDICINE

## 2017-01-10 PROCEDURE — 80053 COMPREHEN METABOLIC PANEL: CPT | Performed by: FAMILY MEDICINE

## 2017-01-10 PROCEDURE — 85025 COMPLETE CBC W/AUTO DIFF WBC: CPT | Performed by: FAMILY MEDICINE

## 2017-01-10 PROCEDURE — 36415 COLL VENOUS BLD VENIPUNCTURE: CPT | Performed by: FAMILY MEDICINE

## 2017-01-10 PROCEDURE — 97116 GAIT TRAINING THERAPY: CPT

## 2017-01-10 PROCEDURE — 74011250637 HC RX REV CODE- 250/637: Performed by: FAMILY MEDICINE

## 2017-01-10 RX ADMIN — LACTULOSE 20 G: 10 SOLUTION ORAL at 08:46

## 2017-01-10 RX ADMIN — INSULIN LISPRO 20 UNITS: 100 INJECTION, SOLUTION INTRAVENOUS; SUBCUTANEOUS at 16:26

## 2017-01-10 RX ADMIN — INSULIN GLARGINE 60 UNITS: 100 INJECTION, SOLUTION SUBCUTANEOUS at 08:46

## 2017-01-10 RX ADMIN — ASPIRIN 81 MG: 81 TABLET, COATED ORAL at 08:46

## 2017-01-10 RX ADMIN — LACTULOSE 20 G: 10 SOLUTION ORAL at 21:38

## 2017-01-10 RX ADMIN — INSULIN LISPRO 5 UNITS: 100 INJECTION, SOLUTION INTRAVENOUS; SUBCUTANEOUS at 11:27

## 2017-01-10 RX ADMIN — PROPRANOLOL HYDROCHLORIDE 10 MG: 10 TABLET ORAL at 08:46

## 2017-01-10 RX ADMIN — PANTOPRAZOLE SODIUM 40 MG: 40 TABLET, DELAYED RELEASE ORAL at 08:24

## 2017-01-10 RX ADMIN — Medication 10 ML: at 21:41

## 2017-01-10 RX ADMIN — RIFAXIMIN 550 MG: 550 TABLET ORAL at 21:39

## 2017-01-10 RX ADMIN — INSULIN LISPRO 3 UNITS: 100 INJECTION, SOLUTION INTRAVENOUS; SUBCUTANEOUS at 16:28

## 2017-01-10 RX ADMIN — SPIRONOLACTONE 25 MG: 25 TABLET ORAL at 08:46

## 2017-01-10 RX ADMIN — ESCITALOPRAM OXALATE 20 MG: 10 TABLET ORAL at 21:39

## 2017-01-10 RX ADMIN — SIMVASTATIN 40 MG: 20 TABLET, FILM COATED ORAL at 21:38

## 2017-01-10 RX ADMIN — RIFAXIMIN 550 MG: 550 TABLET ORAL at 08:46

## 2017-01-10 RX ADMIN — Medication 10 ML: at 15:38

## 2017-01-10 RX ADMIN — INSULIN LISPRO 20 UNITS: 100 INJECTION, SOLUTION INTRAVENOUS; SUBCUTANEOUS at 11:27

## 2017-01-10 RX ADMIN — LACTULOSE 20 G: 10 SOLUTION ORAL at 16:26

## 2017-01-10 NOTE — PROGRESS NOTES
Bedside and Verbal shift change report given to Justine Morgan (oncoming nurse) by Kyra Dobson (offgoing nurse).  Report included the following information SBAR, Kardex, Intake/Output, MAR, Recent Results and Cardiac Rhythm SB.

## 2017-01-10 NOTE — PROGRESS NOTES
Problem: Self Care Deficits Care Plan (Adult)  Goal: *Acute Goals and Plan of Care (Insert Text)  Occupational Therapy Goals  Initiated 1/7/2017  1. Patient will perform grooming with supervision/set-up within 7 day(s). 2. Patient will perform upper body dressing with supervision/set-up within 7 day(s). 3. Patient will perform lower body dressing with moderate assistance within 7 day(s). 4. Patient will perform toilet transfers with minimal assistance/contact guard assist and best technique/adaptive equipment within 7 day(s). 5. Patient will perform all aspects of toileting with moderate assistance within 7 day(s). 6. Patient will participate in upper extremity therapeutic exercise/activities with supervision/set-up for 10 minutes within 7 day(s). 7. Patient will stand for functional task without UE support > or = 2 minutes with min assist within 7 day(s). OCCUPATIONAL THERAPY TREATMENT  Patient: Elzbieta William (77 y.o. male)  Date: 1/10/2017  Diagnosis: right side weakness  Right sided weakness Hemispheric carotid artery syndrome       Precautions: Fall  Chart, occupational therapy assessment, plan of care, and goals were reviewed. ASSESSMENT:  Pt alert and agreeable to OT. He needed moderate assist for supine to sit. Educated as to spinal precautions. Max assist for LB dressing. Pt stood with min assist x 2 and transferred to chair. Pts heart rate 46 beats per minute, RN aware. Pt educated as to performing UE therapeutic exercises while seated in chair to increase strength and endurance for functional tasks. Recommend rehab. Progression toward goals:  [ ]          Improving appropriately and progressing toward goals  [X]          Improving slowly and progressing toward goals  [ ]          Not making progress toward goals and plan of care will be adjusted       PLAN:  Patient continues to benefit from skilled intervention to address the above impairments.   Continue treatment per established plan of care. Discharge Recommendations: Rehab  Further Equipment Recommendations for Discharge: None       SUBJECTIVE:   Patient stated I have trouble touching my nose with my finger.       OBJECTIVE DATA SUMMARY:   Cognitive/Behavioral Status:  Neurologic State: Alert  Orientation Level: Oriented X4  Cognition: Follows commands           Functional Mobility and Transfers for ADLs:              Bed Mobility:     Supine to Sit: Moderate assistance;Assist x2                    Transfers:  Sit to Stand: Minimum assistance;Assist x2                                                                                                     Balance:  Sitting: Intact  Standing: Intact; With support  ADL Intervention:                                Lower Body Dressing Assistance  Socks: Maximum assistance           Pain:  Pain Scale 1: Numeric (0 - 10)  Pain Intensity 1: 0                 Activity Tolerance:    Fair  Please refer to the flowsheet for vital signs taken during this treatment.   After treatment:   [X]  Patient left in no apparent distress sitting up in chair  [ ]  Patient left in no apparent distress in bed  [X]  Call bell left within reach  [X]  Nursing notified  [ ]  Caregiver present  [X]  Chair alarm activated      COMMUNICATION/COLLABORATION:   The patients plan of care was discussed with: Physical Therapy Assistant, Occupational Therapist and Registered Nurse     VERONICA Sexton  Time Calculation: 20 mins

## 2017-01-10 NOTE — PROGRESS NOTES
Daily Progress Note: 1/10/2017  Brendan Hdezdiana. Love Das MD    Assessment/Plan:   1. Right side weakness / Hx-TIA (transient ischemic attack): weakness mild but now inconsistent exam.   --- ECHO ok  --- MRI. brain ok   ---Started asa - watch platelets   ---R3T 7.4%.  ---Neuro Consulted  ---PT/OT consulted  ---Repeat carotid dopplers ok with 0-49% bilat.     2.  Essential hypertension:   ---stop BB for now     3. 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 Thrombocytopenia: stable, 2/2 liver disease. ---monitor     5. Depression:   ---resume lexapro     6. DM type 2 (diabetes mellitus, type 2): with renal complications.   ---Resume insulin with SSI  --- A1c 7.8     7.  Vascular dementia: waxes and wanes     8.  CKD (chronic kidney disease) stage 3: unclear baseline but does not appear to be worse. ---monitor    9. Bradycardia in the 40s  ---will DC Inderal for now    10 . Worsening hepatic encephalopathy in the setting of elevated ammonia levels and history of cirrhosis 2/2 ALFARO, being treated with neurology consult, administration of lactulose and rifaximin and serial monitoring of serum ammonia levels. Improved with increased Lactulose    11. T9 vertebral fx - likely from fall - not much in way of symptoms - ortho consulted    11. Disp- he lives with his wife who is not in good health. Has had multiple falls over the last few weeks. They have a medical alert and this has been activated numerous times. Will ask case management for assistance.   He will likely need Rehab or SNF placement.      DNR/DNI based on medical record review       Problem List:  Problem List as of 1/10/2017  Date Reviewed: 1/6/2017          Codes Class Noted - Resolved    Increased ammonia level ICD-10-CM: R79.89  ICD-9-CM: 790.6  1/9/2017 - Present        Cerebral microvascular disease ICD-10-CM: I67.9  ICD-9-CM: 437.9  1/9/2017 - Present        Right sided weakness ICD-10-CM: M62.81  ICD-9-CM: 728.87  1/9/2017 - Present        * (Principal)Hemispheric carotid artery syndrome ICD-10-CM: G45.1  ICD-9-CM: 435.8  1/7/2017 - Present        CKD (chronic kidney disease) stage 3, GFR 30-59 ml/min ICD-10-CM: N18.3  ICD-9-CM: 585.3  1/6/2017 - Present        ARF (acute renal failure) (HCC) ICD-10-CM: N17.9  ICD-9-CM: 584.9  4/8/2016 - Present        Dizziness ICD-10-CM: R42  ICD-9-CM: 780.4  4/8/2016 - Present        LETY on CPAP (Chronic) ICD-10-CM: G47.33  ICD-9-CM: 327.23  4/8/2016 - Present        DM type 2 (diabetes mellitus, type 2) (HCC) (Chronic) ICD-10-CM: E11.9  ICD-9-CM: 250.00  1/16/2016 - Present        Vascular dementia (Chronic) ICD-10-CM: F01.50  ICD-9-CM: 290.40  1/16/2016 - Present        Hx-TIA (transient ischemic attack) (Chronic) ICD-10-CM: Z86.73  ICD-9-CM: V12.54  1/16/2016 - Present        Hyponatremia ICD-10-CM: E87.1  ICD-9-CM: 276.1  1/16/2016 - Present        Essential hypertension (Chronic) ICD-10-CM: I10  ICD-9-CM: 401.9  12/12/2013 - Present        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 ([facility] (Chronic) ICD-10-CM: K75.81, K74.60  ICD-9-CM: 571.8, 571.5  12/12/2013 - Present        Thrombocytopenia (HCC) (Chronic) ICD-10-CM: D69.6  ICD-9-CM: 287.5  12/12/2013 - Present        Depression (Chronic) ICD-10-CM: F32.9  ICD-9-CM: 348  12/12/2013 - Present        RESOLVED: Acute hepatic encephalopathy ([facility] ICD-10-CM: K72.00  ICD-9-CM: 572.2  1/16/2016 - 4/8/2016        RESOLVED: Dehydration, moderate ICD-10-CM: E86.0  ICD-9-CM: 276.51  1/16/2016 - 4/8/2016        RESOLVED: Lactic acidosis ICD-10-CM: P71.3  ICD-9-CM: 276.2  1/16/2016 - 4/8/2016        RESOLVED: Metabolic encephalopathy QOV-10-BG: G93.41  ICD-9-CM: 348.31  8/19/2014 - 4/8/2016        RESOLVED: TIA (transient ischemic attack) ICD-10-CM: G45.9  ICD-9-CM: 435.9  12/12/2013 - 4/8/2016        RESOLVED: Slurred speech ICD-10-CM: R47.81  ICD-9-CM: 784.59 2013 - 2016        RESOLVED: Blurry vision, left eye ICD-10-CM: H53.8  ICD-9-CM: 368.8  2013 - 2016              HPI:   Mr. Fely Hale is a 73 y.o. with h/o deprsesion, dm, htn, cirrhosis who presents with weakness. He was trying to eat cereal today when he noted right hand weakness. He was unable to pour his milk. The weakness has now resolved. He had TIA in the past, unclear why he is not on asa daily. He denies cp, sob, abd pain (Dr Clayburn Bosworth)    17: His right arm and hand weakness has improved. He was not on a statin or ASA prior to admission. LDL is 60 though. His sister reports that he lives with his wife who is not in good health. He has had multiple falls in the past few weeks usually at night when he needs to get up to the [address] He is slow to answer questions this morning. : Had a bad night. Didn't sleep well last night. HR has been slow so will decrease his Inderal to 20mg BID. Appreciate neuro consult. Statin was recommended so will order this. :  He is more somnolent today and less oriented today. Ammonia level is up and family is concerned so will consult GI also. Remains bradycardic so will reduce Inderal to 10 mg BID and may have to reduce further. 1/10:  Little change. GI has seen and increased Lactulose. Neuro has seen and agrees worsening is due to hepatic encephalopathy. Remains bradycardic - will DC inderal and watch - if not better may need pacer. Ammonia down to 79 today. T9 fx seen on MRI - ortho consulted for opinion. Review of Systems:   A comprehensive review of systems was negative except for that written in the HPI.     Objective:   Physical Exam:     Visit Vitals    /55 (BP 1 Location: Right arm, BP Patient Position: At rest)    Pulse (!) 47    Temp 97.9 °F (36.6 °C)    Resp 20    Ht 5' 9.25\" (1.759 m)    Wt 107 kg (236 lb)    SpO2 97%    BMI 34.6 kg/m2      O2 Device: CPAP mask    Temp (24hrs), Av.9 °F (36.6 °C), Min:97.1 °F (36.2 °C), Max:98.4 °F (36.9 °C)    01/10 0701 - 01/10 1900  In: -   Out: 350 [Urine:350]   01/08 1901 - 01/10 0700  In: -   Out: 725 [Urine:725]    General:  Alert, cooperative but very slow, no distress, appears stated age. Head:  Normocephalic, atraumatic. Eyes:  Conjunctivae/corneas clear. EOMI   Nose: Nares normal.  Wearing CPAP   Throat: Lips, mucosa, and tongue moist.    Neck: Supple, symmetrical, trachea midline, no adenopathy, thyroid: no enlargement/tenderness/nodules, no carotid bruit and no JVD. Lungs:   Clear to auscultation bilaterally. Heart:  Regular rhythm, Rate bradycardic. S1, S2 normal, no murmur, click, rub or gallop. Abdomen:   Soft, non-tender. Non distended, Bowel sounds normal. No masses,  No organomegaly. Extremities: Extremities with trace to 1+ edema. No calf tenderness or cords. Skin: Skin color, texture, turgor normal. No rashes or lesions   Neurologic:   Alert and oriented X 2. Still Not responding as well to commands today. not consistent with strength testing. No cogwheeling or rigidity. Gait not tested at this time. Sensation grossly normal to touch. Data Review:    Carotid Dopplers 1/7/17: IMPRESSION: Findings are consistent with 0-49% range of stenosis of  the right internal carotid and 0-49% range of stenosis of the left  internal carotid. Vertebrals are patent with antegrade flow    BRAIN MRI WITHOUT CONTRAST: 1/6/2017 8:55 PM  INDICATION: Cerebrovascular accident. COMPARISON: 1/17/2016, same day CT head. TECHNIQUE: Images were obtained in multiple planes and sequences to emphasize  T1, T2, and T2* information. In addition, diffusion-weighted images and ADC maps  were also obtained. FINDINGS: The ventricles and sulci are appropriate for age. The main  intracranial flow-voids are normal. Scattered periventricular and subcortical  white matter signal abnormalities are consistent with chronic small vessel  ischemic disease.  No restricted diffusion to suggest acute infarction. Again  noted is chronic opacification of the right maxillary sinus with expansion of  the lateral maxillary sinus wall. IMPRESSION  IMPRESSION: No acute intracranial abnormality. Recent Days:  Recent Labs      01/10/17   0441  01/09/17   0935  01/08/17   0434   WBC  5.9  6.5  6.2   HGB  11.7*  11.1*  11.4*   HCT  35.5*  33.4*  32.9*   PLT  72*  70*  73*     Recent Labs      01/10/17   0441  01/09/17   0700  01/08/17   0434   NA  142  138  142   K  3.9  5.0  4.5   CL  108  108  109*   CO2  23  23  24   GLU  91  186*  127*   BUN  18  19  19   CREA  1.47*  1.52*  1.50*   CA  8.6  8.5  8.3*   ALB  2.5*  2.4*  2.5*   TBILI  1.9*  1.5*  2.0*   SGOT  38*  35  29   ALT  29  28  22     No results for input(s): PH, PCO2, PO2, HCO3, FIO2 in the last 72 hours.     24 Hour Results:  Recent Results (from the past 24 hour(s))   GLUCOSE, POC    Collection Time: 01/09/17 11:13 AM   Result Value Ref Range    Glucose (POC) 131 (H) 65 - 100 mg/dL    Performed by Xneia Matte (PCT)    GLUCOSE, POC    Collection Time: 01/09/17  4:24 PM   Result Value Ref Range    Glucose (POC) 72 65 - 100 mg/dL    Performed by Xenia Matte (PCT)    GLUCOSE, POC    Collection Time: 01/09/17  4:40 PM   Result Value Ref Range    Glucose (POC) 83 65 - 100 mg/dL    Performed by Xenia Matte (PCT)    GLUCOSE, POC    Collection Time: 01/09/17 11:09 PM   Result Value Ref Range    Glucose (POC) 129 (H) 65 - 100 mg/dL    Performed by Moni Ziegler    AMMONIA    Collection Time: 01/10/17  4:41 AM   Result Value Ref Range    Ammonia 79 (H) <32 UMOL/L   CBC WITH AUTOMATED DIFF    Collection Time: 01/10/17  4:41 AM   Result Value Ref Range    WBC 5.9 4.1 - 11.1 K/uL    RBC 3.67 (L) 4.10 - 5.70 M/uL    HGB 11.7 (L) 12.1 - 17.0 g/dL    HCT 35.5 (L) 36.6 - 50.3 %    MCV 96.7 80.0 - 99.0 FL    MCH 31.9 26.0 - 34.0 PG    MCHC 33.0 30.0 - 36.5 g/dL    RDW 14.5 11.5 - 14.5 %    PLATELET 72 (L) 160 - 400 K/uL    NEUTROPHILS 47 32 - 75 %    LYMPHOCYTES 32 12 - 49 %    MONOCYTES 13 5 - 13 %    EOSINOPHILS 7 0 - 7 %    BASOPHILS 1 0 - 1 %    ABS. NEUTROPHILS 2.7 1.8 - 8.0 K/UL    ABS. LYMPHOCYTES 1.9 0.8 - 3.5 K/UL    ABS. MONOCYTES 0.8 0.0 - 1.0 K/UL    ABS. EOSINOPHILS 0.4 0.0 - 0.4 K/UL    ABS. BASOPHILS 0.1 0.0 - 0.1 K/UL   METABOLIC PANEL, COMPREHENSIVE    Collection Time: 01/10/17  4:41 AM   Result Value Ref Range    Sodium 142 136 - 145 mmol/L    Potassium 3.9 3.5 - 5.1 mmol/L    Chloride 108 97 - 108 mmol/L    CO2 23 21 - 32 mmol/L    Anion gap 11 5 - 15 mmol/L    Glucose 91 65 - 100 mg/dL    BUN 18 6 - 20 MG/DL    Creatinine 1.47 (H) 0.70 - 1.30 MG/DL    BUN/Creatinine ratio 12 12 - 20      GFR est AA 58 (L) >60 ml/min/1.73m2    GFR est non-AA 47 (L) >60 ml/min/1.73m2    Calcium 8.6 8.5 - 10.1 MG/DL    Bilirubin, total 1.9 (H) 0.2 - 1.0 MG/DL    ALT 29 12 - 78 U/L    AST 38 (H) 15 - 37 U/L    Alk.  phosphatase 53 45 - 117 U/L    Protein, total 6.2 (L) 6.4 - 8.2 g/dL    Albumin 2.5 (L) 3.5 - 5.0 g/dL    Globulin 3.7 2.0 - 4.0 g/dL    A-G Ratio 0.7 (L) 1.1 - 2.2     GLUCOSE, POC    Collection Time: 01/10/17  7:34 AM   Result Value Ref Range    Glucose (POC) 85 65 - 100 mg/dL    Performed by Jacquelyn Marie (PCT)        Medications reviewed  Current Facility-Administered Medications   Medication Dose Route Frequency    propranolol (INDERAL) tablet 10 mg  10 mg Oral BID    simvastatin (ZOCOR) tablet 40 mg  40 mg Oral QHS    aspirin delayed-release tablet 81 mg  81 mg Oral DAILY    escitalopram oxalate (LEXAPRO) tablet 20 mg  20 mg Oral QHS    lactulose (CHRONULAC) solution 20 g  20 g Oral TID    pantoprazole (PROTONIX) tablet 40 mg  40 mg Oral ACB    rifAXIMin (XIFAXAN) tablet 550 mg  550 mg Oral BID    spironolactone (ALDACTONE) tablet 25 mg  25 mg Oral DAILY    cyclobenzaprine (FLEXERIL) tablet 5 mg  5 mg Oral TID PRN    insulin lispro (HUMALOG) injection 20 Units  20 Units SubCUTAneous TIDAC    insulin glargine (LANTUS) injection 60 Units  60 Units SubCUTAneous DAILY    sodium chloride (NS) flush 5-10 mL  5-10 mL IntraVENous Q8H    sodium chloride (NS) flush 5-10 mL  5-10 mL IntraVENous PRN    glucose chewable tablet 16 g  4 Tab Oral PRN    dextrose (D50W) injection syrg 12.5-25 g  12.5-25 g IntraVENous PRN    glucagon (GLUCAGEN) injection 1 mg  1 mg IntraMUSCular PRN    insulin lispro (HUMALOG) injection   SubCUTAneous QID WITH MEALS       Care Plan discussed with: Patient/Family and Nurse    Total time spent with patient and review of records: 30 minutes.     Bertrand Mishra MD

## 2017-01-10 NOTE — CDMP QUERY
1.   Please clarify if this patient is being treated/managed for:    =>Worsening hepatic encephalopathy in the setting of elevated ammonia levels and history of cirrhosis 2/2 ALFARO, being treated with neurology consult, administration of lactulose and rifaximin and serial monitoring of serum ammonia levels. =>Other Explanation of clinical findings  =>Unable to Determine (no explanation of clinical findings)    The medical record reflects the following clinical findings, treatment, and risk factors:    Risk Factors: ALFARO, cirrhosis    Clinical Indicators: 72 y/o male originally admitted for R sided weakness and poor balance and diagnosed with transient cerebral ischemia. He is originally admitted to observation on 1/6 but on 1/9 due to elevated ammonia levels he is transitioned to inpatient status. GI renders diagnosis of   \" Patient known to us with hepatic encephalopathy secondary to ALFARO cirrhosis, now with worsening hepatic encephalopathy and elevated ammonia level. \" Neurology renders same. Treatment: lactulose, rifaximin and serial monitoring of serum ammonia levels     Please clarify and document your clinical opinion in the progress notes and discharge summary including the definitive and/or presumptive diagnosis, (suspected or probable), related to the above clinical findings. Please include clinical findings supporting your diagnosis. Thanks for your time.     Griffin Winn RN, BSN, [address]

## 2017-01-10 NOTE — PROGRESS NOTES
[facility]: [address] [facility]  [address] [place] [address]  [phone]    * Inpatient Progress Note *    NAME:  Otilia Chung. :  1945  MRN:  [id number]  PCP:    Kandace Baldwin MD    Attending Addendum:   Discussed with NP Carlos Elizabeth earlier. 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 Evaluated by orthopedics, recommended conservative care and f/u with spine-ortho as o/patient. No additional neurology advice. Will s/o. Ponciano Leyden, MD  ______________________________________________________________________  * I have reviewed labs, studies, notes and hospital records.  --MORRIS Estrada  ______________________________________________________________________    Discussion with pt  :  ·  pt awake, off of CPAP/Bi-Pap now  · MRI C and T spine results  · mild T9 fracture likely occurred at home when he fell  ·  will consult Ortho to have their input/recommenations regarding fracture  · No further neurology suggestions or input    Will sign off  Stable from neurologic perspective  ____________________________________________________________________    Collaborating care team physician, Dr. Smith Osborn     ____________________________________________________________________    REE Estrada

## 2017-01-10 NOTE — PROGRESS NOTES
Problem: Patient Education: Go to Patient Education Activity  Goal: Patient/Family Education  PHYSICAL THERAPY TREATMENT  Patient: Amanda Rodriguez (77 y.o. male)  Date: 1/10/2017  Diagnosis: right side weakness  Right sided weakness Hemispheric carotid artery syndrome       Precautions: Fall  Chart, physical therapy assessment, plan of care and goals were reviewed. ASSESSMENT:  Pt instructed on log roll needing min assist.Pt comes to sit with mod assist of 2. Pt min assist of 2 to stand. Pt ambulated 8ft with RW min assist of 2. Pt left sitting in chair. Pt is slow to respond but follows well. Pt progressing slowly. Continue goals. Progression toward goals:   [ ]      Improving appropriately and progressing toward goals  [X]      Improving slowly and progressing toward goals  [ ]      Not making progress toward goals and plan of care will be adjusted       PLAN:  Patient continues to benefit from skilled intervention to address the above impairments. Continue treatment per established plan of care. Discharge Recommendations:  Rehab  Further Equipment Recommendations for Discharge:  rolling walker       SUBJECTIVE:          OBJECTIVE DATA SUMMARY:   Critical Behavior:  Neurologic State: Alert  Orientation Level: Oriented X4  Cognition: Follows commands  Safety/Judgement: Awareness of environment, Fall prevention, Decreased insight into deficits  Functional Mobility Training:  Bed Mobility:     Supine to Sit: Moderate assistance;Assist x2                          Transfers:  Sit to Stand: Minimum assistance;Assist x2  Stand to Sit: Contact guard assistance                             Balance:  Sitting: Intact  Standing: Intact; With support  Ambulation/Gait Training:  Distance (ft): 8 Feet (ft)  Assistive Device: Gait belt;Walker, rolling  Ambulation - Level of Assistance: Minimal assistance;Assist x2        Gait Abnormalities: Decreased step clearance        Base of Support: Narrowed     Speed/Amy: Avani Steele decreased (<100 feet/min)  Step Length: Right shortened;Left shortened              Pain:  Pain Scale 1: Numeric (0 - 10)  Pain Intensity 1: 0              Activity Tolerance:   Pt tolerated treatment well. Please refer to the flowsheet for vital signs taken during this treatment.   After treatment:   [ ] Patient left in no apparent distress sitting up in chair  [ ] Patient left in no apparent distress in bed  [ ] Call bell left within reach  [ ] Nursing notified  [ ] Caregiver present  [ ] Bed alarm activated      COMMUNICATION/COLLABORATION:   The patients plan of care was discussed with: Physical Therapist     Mara Castro PTA   Time Calculation: 23 mins

## 2017-01-10 NOTE — DIABETES MGMT
DTC Progress Note: Low Blood Glucose (less than 80)    Recommendations/ Comments:  Chart reviewed on Jerman Harris. for glucose less than 80 mg/dL. Sarah Naqvi. is a 73 y.o. male with a past medical history significant for Type 2 Diabetes per Dr. Sergio Gamez H&P dated 01/06/2017. If appropriate, please consider   1. Decreasing Lantus from 60 units in the morning to 54 units in the morning to decrease risk for hypoglycemia. Hospital (inpatient) medications:  1. Correction Scale: Lispro (Humalog) Normal Sensitivity scale to cover for glucose > 139 mg/dL before meals and for glucose >199 at bedtime      2. Lantus 60 units daily       Prior to admission medications: per past medical records  1. Lantus SOLOSTAR pen 60 units daily   2. Novolog Flexpen 20 units before breakfast, lunch and dinner        A1C:   Lab Results   Component Value Date/Time    Hemoglobin A1c 7.8 01/07/2017 06:28 AM     Reference range*:  Increased risk for diabetes: 5.7 - 6.4%  Diabetes: >6.4%  Glycemic control for adults with diabetes: <7.0 %    *JOSE ROBERTO TOLEDO (2014). Diagnosis and classification of diabetes mellitus. Diabetes care, 37, S81. Recent Glucose Results:   Lab Results   Component Value Date/Time    GLU 91 01/10/2017 04:41 AM    GLUCPOC 85 01/10/2017 07:34 AM    GLUCPOC 129 (H) 01/09/2017 11:09 PM    GLUCPOC 83 01/09/2017 04:40 PM      Lab Results   Component Value Date/Time    Creatinine 1.47 01/10/2017 04:41 AM     Active Orders   Diet    DIET CARDIAC Regular      PO intake: No data found. Thank you. Lamar Navarro.  Maria Elena Hendrix, RN, BSN, MPH  Diabetes [address] [place] [address]  [phone]

## 2017-01-10 NOTE — PROGRESS NOTES
[facility] MD  [phone]           GI PROGRESS NOTE      NAME: Zuly Mcneill. :  1945   MRN:  [id number]       Subjective:   No new events. Pt oriented in time and place this am. Denies abdominal pain and /or GI bleed. No fevers. Pt tolerating po intake        VITALS:   Last 24hrs VS reviewed since prior progress note. Most recent are:  Visit Vitals    /55 (BP 1 Location: Right arm, BP Patient Position: At rest)    Pulse (!) 47    Temp 97.9 °F (36.6 °C)    Resp 20    Ht 5' 9.25\" (1.759 m)    Wt 107 kg (236 lb)    SpO2 97%    BMI 34.6 kg/m2       Intake/Output Summary (Last 24 hours) at 01/10/17 1402  Last data filed at 01/10/17 1113   Gross per 24 hour   Intake                0 ml   Output             1225 ml   Net            -1225 ml     PHYSICAL EXAM:  General: Alert, in no acute distress    HEENT: Anicteric sclerae. Lungs:  CTA Bilaterally. Heart:  Regular  rhythm,    Abdomen: Soft, Non distended, Non tender.  (+)Bowel sounds, no HSM  Neurologic:  Alert and oriented X 3. No acute neurological distress   Psych:    Not anxious nor agitated.     Lab Data Reviewed:   Recent Labs      01/10/17   04417   0935   WBC  5.9  6.5   HGB  11.7*  11.1*   HCT  35.5*  33.4*   PLT  72*  70*     Recent Labs      01/10/17   0441  17   0700   NA  142  138   K  3.9  5.0   CL  108  108   CO2  23  23   BUN  18  19   CREA  1.47*  1.52*   GLU  91  186*   CA  8.6  8.5     Recent Labs      01/10/17   0441  17   0700   SGOT  38*  35   AP  53  61   TP  6.2*  6.1*   ALB  2.5*  2.4*   GLOB  3.7  3.7       ________________________________________________________________________  Patient Active Problem List   Diagnosis Code    Essential hypertension I10    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 (HCC) K75.81, K74.60    Thrombocytopenia (HCC) D69.6    Depression F32.9    DM type 2 (diabetes mellitus, type 2) (HCC) E11.9    Vascular dementia F01.50  Hx-TIA (transient ischemic attack) Z86.73    Hyponatremia E87.1    ARF (acute renal failure) (HCC) N17.9    Dizziness R42    LETY on CPAP G47.33    CKD (chronic kidney disease) stage 3, GFR 30-59 ml/min N18.3    Hemispheric carotid artery syndrome G45.1    Increased ammonia level R79.89    Cerebral microvascular disease I67.9    Right sided weakness M62.81        Assessment:   · Encephalopathy- improved on lactulose and rifaximin. Unclear precipitating cause but improved. Possibly non compliance at home. · Cirrhosis-stable other than HE .  No obvious GI bleed   Plan:   · Cont supportive tx with lactulose and rifaximin  · Agree with neuro eval to r/o TIA  · Following with you       Signed By: Cathi Marks MD     1/10/2017  2:02 PM

## 2017-01-10 NOTE — CONSULTS
JOINT  CONSULT    Subjective:     Date of Consultation:  January 10, 2017    Referring Physician:  Leslie Yanes, REX    Benito Camarillo. is a 73 y.o.  male admitted with TIA. Eval by neuro revealed T9 compression fx. Per discussion with Mr. Benjamin Cooper and review of EMR, he has had several falls at home recently, although he is not able to give details of any specific fall. He has vague complaints of \"only a little\" pain in mid back. Things worse when moving from supine to sitting, but unsure. Denies neck/low back pain. Denies numbness/tingling.       Patient Active Problem List    Diagnosis Date Noted    Increased ammonia level 01/09/2017    Cerebral microvascular disease 01/09/2017    Right sided weakness 01/09/2017    Hemispheric carotid artery syndrome 01/07/2017    CKD (chronic kidney disease) stage 3, GFR 30-59 ml/min 01/06/2017    ARF (acute renal failure) ([address] 04/08/2016    Dizziness 04/08/2016    LETY on CPAP 04/08/2016    DM type 2 (diabetes mellitus, type 2) ([address] 01/16/2016    Vascular dementia 01/16/2016    Hx-TIA (transient ischemic attack) 01/16/2016    Hyponatremia 01/16/2016    Essential hypertension 12/12/2013    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 ([address] 12/12/2013    Thrombocytopenia ([address] 12/12/2013    Depression 12/12/2013     Family History   Problem Relation Age of Onset    Hypertension Other     Diabetes Other       Social History   Substance Use Topics    Smoking status: Never Smoker    Smokeless tobacco: Never Used    Alcohol use No     Past Medical History   Diagnosis Date    Anxiety     Arthritis     Cerebral microvascular disease 1/9/2017    Depression     Diabetes mellitus ([address]     Hypertension     Increased ammonia level 1/9/2017    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 (HCC)     Muscle pain     PUD (peptic ulcer disease)     Sleep apnea     Snoring     Visual disturbance       Past Surgical History   Procedure Laterality Date    Hx heent      Hx tonsillectomy      Hx orthopaedic       carly tendon repair    Hx appendectomy      Hx hernia repair      Hx hernia repair        Prior to Admission medications    Medication Sig Start Date End Date Taking? Authorizing Provider   aspirin delayed-release 81 mg tablet Take 81 mg by mouth daily. Yes Historical Provider   propranolol (INDERAL) 40 mg tablet Take 40 mg by mouth two (2) times a day. Yes Historical Provider   magnesium oxide (MAG-OX) 400 mg tablet Take 1 Tab by mouth two (2) times a day. 4/13/16  Yes Barbara Dumas MD   rifaximin Margkathya Cunestela) 550 mg tablet Take 1 Tab by mouth two (2) times a day. 4/13/16  Yes Barbara Dumas MD   spironolactone (ALDACTONE) 25 mg tablet Take 25 mg by mouth daily. Yes Historical Provider   cyclobenzaprine (FLEXERIL) 10 mg tablet Take 5-10 mg by mouth three (3) times daily as needed for Muscle Spasm(s). Yes Historical Provider   insulin glargine (LANTUS SOLOSTAR) 100 unit/mL (3 mL) pen 60 Units by SubCUTAneous route daily. Yes Historical Provider   lactulose (CHRONULAC) 10 gram/15 mL solution Take 20 g by mouth three (3) times daily. Yes Historical Provider   insulin aspart (NOVOLOG) 100 unit/mL inpn 20 Units by SubCUTAneous route Before breakfast, lunch, and dinner. Yes Historical Provider   omeprazole (PRILOSEC) 40 mg capsule Take 40 mg by mouth daily. Yes Historical Provider   escitalopram (LEXAPRO) 20 mg tablet Take 20 mg by mouth nightly. Yes Historical Provider   albuterol (VENTOLIN HFA) 90 mcg/actuation inhaler Take 1 Puff by inhalation every four (4) hours as needed for Wheezing or Shortness of Breath.    Yes Historical Provider     Current Facility-Administered Medications   Medication Dose Route Frequency    simvastatin (ZOCOR) tablet 40 mg  40 mg Oral QHS    aspirin delayed-release tablet 81 mg  81 mg Oral DAILY    escitalopram oxalate (LEXAPRO) tablet 20 mg  20 mg Oral QHS    lactulose (CHRONULAC) solution 20 g  20 g Oral TID    pantoprazole (PROTONIX) tablet 40 mg  40 mg Oral ACB    rifAXIMin (XIFAXAN) tablet 550 mg  550 mg Oral BID    spironolactone (ALDACTONE) tablet 25 mg  25 mg Oral DAILY    cyclobenzaprine (FLEXERIL) tablet 5 mg  5 mg Oral TID PRN    insulin lispro (HUMALOG) injection 20 Units  20 Units SubCUTAneous TIDAC    insulin glargine (LANTUS) injection 60 Units  60 Units SubCUTAneous DAILY    sodium chloride (NS) flush 5-10 mL  5-10 mL IntraVENous Q8H    sodium chloride (NS) flush 5-10 mL  5-10 mL IntraVENous PRN    glucose chewable tablet 16 g  4 Tab Oral PRN    dextrose (D50W) injection syrg 12.5-25 g  12.5-25 g IntraVENous PRN    glucagon (GLUCAGEN) injection 1 mg  1 mg IntraMUSCular PRN    insulin lispro (HUMALOG) injection   SubCUTAneous QID WITH MEALS     Allergies   Allergen Reactions    Codeine Shortness of Breath    Lipitor [Atorvastatin] Other (comments)     Stiff neck    Lisinopril Myalgia    Oxycodone Other (comments)     Cant walk cant breathe, need to use my c pap machine    Pcn [Penicillins] Shortness of Breath        Review of Systems:  Review of systems not obtained due to patient factors. , dementia. Objective:     Patient Vitals for the past 8 hrs:   BP Temp Pulse Resp SpO2   01/10/17 0907 110/55 97.9 °F (36.6 °C) (!) 47 20 97 %   01/10/17 0703 120/62 97.1 °F (36.2 °C) (!) 43 16 98 %   01/10/17 0700 - - (!) 43 - -     Temp (24hrs), Av.9 °F (36.6 °C), Min:97.1 °F (36.2 °C), Max:98.4 °F (36.9 °C)        EXAM:   Awake, alert, oriented to person and \"hospital\"  No C or L spine TTP  Very mild vertebral tenderness about T8 and T9,   BLE - 5/5 hip flexors, KE, ankle DF/PF. Pedal pulses palpable B.     equal.     Data Review   Recent Results (from the past 24 hour(s))   GLUCOSE, POC    Collection Time: 17  4:24 PM   Result Value Ref Range    Glucose (POC) 72 65 - 100 mg/dL    Performed by Chapito Rose (PCT)    GLUCOSE, POC    Collection Time: 17  4:40 PM   Result Value Ref Range    Glucose (POC) 83 65 - 100 mg/dL    Performed by Donna Galeano (PCT)    GLUCOSE, POC    Collection Time: 01/09/17 11:09 PM   Result Value Ref Range    Glucose (POC) 129 (H) 65 - 100 mg/dL    Performed by Carmen Unger    AMMONIA    Collection Time: 01/10/17  4:41 AM   Result Value Ref Range    Ammonia 79 (H) <32 UMOL/L   CBC WITH AUTOMATED DIFF    Collection Time: 01/10/17  4:41 AM   Result Value Ref Range    WBC 5.9 4.1 - 11.1 K/uL    RBC 3.67 (L) 4.10 - 5.70 M/uL    HGB 11.7 (L) 12.1 - 17.0 g/dL    HCT 35.5 (L) 36.6 - 50.3 %    MCV 96.7 80.0 - 99.0 FL    MCH 31.9 26.0 - 34.0 PG    MCHC 33.0 30.0 - 36.5 g/dL    RDW 14.5 11.5 - 14.5 %    PLATELET 72 (L) 265 - 400 K/uL    NEUTROPHILS 47 32 - 75 %    LYMPHOCYTES 32 12 - 49 %    MONOCYTES 13 5 - 13 %    EOSINOPHILS 7 0 - 7 %    BASOPHILS 1 0 - 1 %    ABS. NEUTROPHILS 2.7 1.8 - 8.0 K/UL    ABS. LYMPHOCYTES 1.9 0.8 - 3.5 K/UL    ABS. MONOCYTES 0.8 0.0 - 1.0 K/UL    ABS. EOSINOPHILS 0.4 0.0 - 0.4 K/UL    ABS. BASOPHILS 0.1 0.0 - 0.1 K/UL   METABOLIC PANEL, COMPREHENSIVE    Collection Time: 01/10/17  4:41 AM   Result Value Ref Range    Sodium 142 136 - 145 mmol/L    Potassium 3.9 3.5 - 5.1 mmol/L    Chloride 108 97 - 108 mmol/L    CO2 23 21 - 32 mmol/L    Anion gap 11 5 - 15 mmol/L    Glucose 91 65 - 100 mg/dL    BUN 18 6 - 20 MG/DL    Creatinine 1.47 (H) 0.70 - 1.30 MG/DL    BUN/Creatinine ratio 12 12 - 20      GFR est AA 58 (L) >60 ml/min/1.73m2    GFR est non-AA 47 (L) >60 ml/min/1.73m2    Calcium 8.6 8.5 - 10.1 MG/DL    Bilirubin, total 1.9 (H) 0.2 - 1.0 MG/DL    ALT 29 12 - 78 U/L    AST 38 (H) 15 - 37 U/L    Alk.  phosphatase 53 45 - 117 U/L    Protein, total 6.2 (L) 6.4 - 8.2 g/dL    Albumin 2.5 (L) 3.5 - 5.0 g/dL    Globulin 3.7 2.0 - 4.0 g/dL    A-G Ratio 0.7 (L) 1.1 - 2.2     GLUCOSE, POC    Collection Time: 01/10/17  7:34 AM   Result Value Ref Range    Glucose (POC) 85 65 - 100 mg/dL    Performed by Osorio Morales (PCT)    GLUCOSE, POC    Collection Time: 01/10/17 11:11 AM   Result Value Ref Range    Glucose (POC) 257 (H) 65 - 100 mg/dL    Performed by Elvin Episcopal (PCT)          Assessment/Plan:     Thoracic Compression Fracture - T9   Minimal sx   Neurovascularly intact. Recommend Conservative management: Spine Precautions - log roll, no bending/twistin, no lifting, pulling, pushing over 5 pounds.    May follow up with Dr. Chelita Cortez for follow up in 2-3 weeks     D/W Dr. Magdaleno Hunter (on call [facility])  JUVENCIO Aragon  Pager: [phone]

## 2017-01-10 NOTE — CONSULTS
Letter of Determination: Upgrade from Observation to Inpatient Status    This patient was originally hospitalized as observation status on 1-6-17 for weakness. This patient now meets for Inpatient Admission in accordance with CMS regulation Section 43 .3. Specifically, patient's stay is now over Two Midnights and was medically necessary. By 1-9-17, ammonia level is rising so lactulose is started. Additionally, pt noted to be fátima cardic w/pulse in the 40's. Consistent with CMS guidelines, patient meets for inpatient status.     Nyasia Max MD, KATERYNA, [facility]  Chief Medical Officer, Board Certified Physician Advisor  [address] [facility].

## 2017-01-11 LAB
ALBUMIN SERPL BCP-MCNC: 2.4 G/DL (ref 3.5–5)
ALBUMIN/GLOB SERPL: 0.6 {RATIO} (ref 1.1–2.2)
ALP SERPL-CCNC: 63 U/L (ref 45–117)
ALT SERPL-CCNC: 29 U/L (ref 12–78)
ANION GAP BLD CALC-SCNC: 7 MMOL/L (ref 5–15)
AST SERPL W P-5'-P-CCNC: 39 U/L (ref 15–37)
BASOPHILS # BLD AUTO: 0.1 K/UL (ref 0–0.1)
BASOPHILS # BLD: 1 % (ref 0–1)
BILIRUB SERPL-MCNC: 1.5 MG/DL (ref 0.2–1)
BUN SERPL-MCNC: 17 MG/DL (ref 6–20)
BUN/CREAT SERPL: 13 (ref 12–20)
CALCIUM SERPL-MCNC: 8.8 MG/DL (ref 8.5–10.1)
CHLORIDE SERPL-SCNC: 109 MMOL/L (ref 97–108)
CO2 SERPL-SCNC: 24 MMOL/L (ref 21–32)
CREAT SERPL-MCNC: 1.3 MG/DL (ref 0.7–1.3)
EOSINOPHIL # BLD: 0.5 K/UL (ref 0–0.4)
EOSINOPHIL NFR BLD: 7 % (ref 0–7)
ERYTHROCYTE [DISTWIDTH] IN BLOOD BY AUTOMATED COUNT: 14.5 % (ref 11.5–14.5)
GLOBULIN SER CALC-MCNC: 3.8 G/DL (ref 2–4)
GLUCOSE BLD STRIP.AUTO-MCNC: 131 MG/DL (ref 65–100)
GLUCOSE BLD STRIP.AUTO-MCNC: 173 MG/DL (ref 65–100)
GLUCOSE BLD STRIP.AUTO-MCNC: 227 MG/DL (ref 65–100)
GLUCOSE BLD STRIP.AUTO-MCNC: 307 MG/DL (ref 65–100)
GLUCOSE BLD STRIP.AUTO-MCNC: 74 MG/DL (ref 65–100)
GLUCOSE BLD STRIP.AUTO-MCNC: 88 MG/DL (ref 65–100)
GLUCOSE SERPL-MCNC: 76 MG/DL (ref 65–100)
HCT VFR BLD AUTO: 35 % (ref 36.6–50.3)
HGB BLD-MCNC: 11.9 G/DL (ref 12.1–17)
LYMPHOCYTES # BLD AUTO: 27 % (ref 12–49)
LYMPHOCYTES # BLD: 1.9 K/UL (ref 0.8–3.5)
MCH RBC QN AUTO: 33.1 PG (ref 26–34)
MCHC RBC AUTO-ENTMCNC: 34 G/DL (ref 30–36.5)
MCV RBC AUTO: 97.2 FL (ref 80–99)
MONOCYTES # BLD: 1.1 K/UL (ref 0–1)
MONOCYTES NFR BLD AUTO: 15 % (ref 5–13)
NEUTS SEG # BLD: 3.5 K/UL (ref 1.8–8)
NEUTS SEG NFR BLD AUTO: 50 % (ref 32–75)
PLATELET # BLD AUTO: 66 K/UL (ref 150–400)
POTASSIUM SERPL-SCNC: 4.7 MMOL/L (ref 3.5–5.1)
PROT SERPL-MCNC: 6.2 G/DL (ref 6.4–8.2)
RBC # BLD AUTO: 3.6 M/UL (ref 4.1–5.7)
SERVICE CMNT-IMP: ABNORMAL
SERVICE CMNT-IMP: NORMAL
SERVICE CMNT-IMP: NORMAL
SODIUM SERPL-SCNC: 140 MMOL/L (ref 136–145)
WBC # BLD AUTO: 7 K/UL (ref 4.1–11.1)

## 2017-01-11 PROCEDURE — 74011636637 HC RX REV CODE- 636/637: Performed by: INTERNAL MEDICINE

## 2017-01-11 PROCEDURE — 85025 COMPLETE CBC W/AUTO DIFF WBC: CPT | Performed by: FAMILY MEDICINE

## 2017-01-11 PROCEDURE — 74011250637 HC RX REV CODE- 250/637: Performed by: INTERNAL MEDICINE

## 2017-01-11 PROCEDURE — 80053 COMPREHEN METABOLIC PANEL: CPT | Performed by: FAMILY MEDICINE

## 2017-01-11 PROCEDURE — 74011250637 HC RX REV CODE- 250/637: Performed by: FAMILY MEDICINE

## 2017-01-11 PROCEDURE — 65660000000 HC RM CCU STEPDOWN

## 2017-01-11 PROCEDURE — 97530 THERAPEUTIC ACTIVITIES: CPT

## 2017-01-11 PROCEDURE — 82962 GLUCOSE BLOOD TEST: CPT

## 2017-01-11 PROCEDURE — 97110 THERAPEUTIC EXERCISES: CPT

## 2017-01-11 PROCEDURE — 97535 SELF CARE MNGMENT TRAINING: CPT

## 2017-01-11 PROCEDURE — 36415 COLL VENOUS BLD VENIPUNCTURE: CPT | Performed by: FAMILY MEDICINE

## 2017-01-11 PROCEDURE — 97116 GAIT TRAINING THERAPY: CPT

## 2017-01-11 RX ADMIN — Medication 10 ML: at 22:03

## 2017-01-11 RX ADMIN — INSULIN LISPRO 2 UNITS: 100 INJECTION, SOLUTION INTRAVENOUS; SUBCUTANEOUS at 16:34

## 2017-01-11 RX ADMIN — SIMVASTATIN 40 MG: 20 TABLET, FILM COATED ORAL at 22:01

## 2017-01-11 RX ADMIN — INSULIN LISPRO 20 UNITS: 100 INJECTION, SOLUTION INTRAVENOUS; SUBCUTANEOUS at 16:34

## 2017-01-11 RX ADMIN — INSULIN LISPRO 2 UNITS: 100 INJECTION, SOLUTION INTRAVENOUS; SUBCUTANEOUS at 22:02

## 2017-01-11 RX ADMIN — LACTULOSE 20 G: 10 SOLUTION ORAL at 10:42

## 2017-01-11 RX ADMIN — LACTULOSE 20 G: 10 SOLUTION ORAL at 22:02

## 2017-01-11 RX ADMIN — PANTOPRAZOLE SODIUM 40 MG: 40 TABLET, DELAYED RELEASE ORAL at 07:16

## 2017-01-11 RX ADMIN — Medication 10 ML: at 07:15

## 2017-01-11 RX ADMIN — RIFAXIMIN 550 MG: 550 TABLET ORAL at 10:43

## 2017-01-11 RX ADMIN — INSULIN LISPRO 7 UNITS: 100 INJECTION, SOLUTION INTRAVENOUS; SUBCUTANEOUS at 11:37

## 2017-01-11 RX ADMIN — INSULIN GLARGINE 60 UNITS: 100 INJECTION, SOLUTION SUBCUTANEOUS at 10:55

## 2017-01-11 RX ADMIN — LACTULOSE 20 G: 10 SOLUTION ORAL at 16:25

## 2017-01-11 RX ADMIN — ESCITALOPRAM OXALATE 20 MG: 10 TABLET ORAL at 22:02

## 2017-01-11 RX ADMIN — Medication 10 ML: at 16:25

## 2017-01-11 RX ADMIN — SPIRONOLACTONE 25 MG: 25 TABLET ORAL at 10:43

## 2017-01-11 RX ADMIN — ASPIRIN 81 MG: 81 TABLET, COATED ORAL at 10:43

## 2017-01-11 RX ADMIN — RIFAXIMIN 550 MG: 550 TABLET ORAL at 22:03

## 2017-01-11 RX ADMIN — INSULIN LISPRO 20 UNITS: 100 INJECTION, SOLUTION INTRAVENOUS; SUBCUTANEOUS at 11:38

## 2017-01-11 NOTE — PROGRESS NOTES
Bedside and Verbal shift change report given to [address] [place] [address] Drive (oncoming nurse) by Ramon Mckinley RN (offgoing nurse). Report included the following information SBAR, Kardex, Intake/Output, MAR and Accordion.

## 2017-01-11 NOTE — INTERDISCIPLINARY ROUNDS
Interdisciplinary team rounds were held 1/11/2017 with the following team members:Care Management, Nursing, Pharmacy, Physical Therapy and Physician. Plan of care discussed.    Anticipated Discharge: Possibly tomorrow to SNF

## 2017-01-11 NOTE — PROGRESS NOTES
Problem: Self Care Deficits Care Plan (Adult)  Goal: *Acute Goals and Plan of Care (Insert Text)  Occupational Therapy Goals  Initiated 1/7/2017  1. Patient will perform grooming with supervision/set-up within 7 day(s). 2. Patient will perform upper body dressing with supervision/set-up within 7 day(s). 3. Patient will perform lower body dressing with moderate assistance within 7 day(s). 4. Patient will perform toilet transfers with minimal assistance/contact guard assist and best technique/adaptive equipment within 7 day(s). 5. Patient will perform all aspects of toileting with moderate assistance within 7 day(s). 6. Patient will participate in upper extremity therapeutic exercise/activities with supervision/set-up for 10 minutes within 7 day(s). 7. Patient will stand for functional task without UE support > or = 2 minutes with min assist within 7 day(s). OCCUPATIONAL THERAPY TREATMENT  Patient: José Antonio Conte (77 y.o. male)  Date: 1/11/2017  Diagnosis: right side weakness  Right sided weakness Hemispheric carotid artery syndrome       Precautions: Fall  Chart, occupational therapy assessment, plan of care, and goals were reviewed. ASSESSMENT:  Pt seated in chair agreeable to OT. Pt able to brush his teeth and hair with contact guard. Educated pt as to BEFAST and signs/symptoms of stroke. He had buckling of his R LE during ambulation to the rest room and is a fall risk. Pt engaged with 2 sets of UE exercise to increase strength and endurance for functional tasks. Pt educated as to fully completing ROM to each exercise. Pt needs increased time to tasks and with decreased coordination  but able to follow commands. Pt very motivated to improve his status. Recommend rehab.   Progression toward goals:  [ ]          Improving appropriately and progressing toward goals  [X]          Improving slowly and progressing toward goals  [ ]          Not making progress toward goals and plan of care will be adjusted       PLAN:  Patient continues to benefit from skilled intervention to address the above impairments. Continue treatment per established plan of care. Discharge Recommendations:  Rehab  Further Equipment Recommendations for Discharge:  None       SUBJECTIVE:   Patient stated I know my right side is weaker.       OBJECTIVE DATA SUMMARY:   Cognitive/Behavioral Status:  Neurologic State: Alert  Orientation Level: Oriented X4  Cognition: Follows commands           Functional Mobility and Transfers for ADLs:              Bed Mobility:      Not tested as pt already up in the chair. Transfers:      Min assist for sit to stand from chair. Balance: Impaired standing balance. ADL Intervention:        Grooming  Brushing Teeth: Contact guard assistance  Brushing/Combing Hair: Supervision/set-up      Therapeutic Exercises:     EXERCISE   Sets   Reps   Active Active Assist   Passive   Comments   Finger flex/ext 2 10 [X]           [ ]           [ ]               Wrist flex/ext 2 10 [X]           [ ]           [ ]               Forearm supination/pronation 2 10 [X]           [ ]           [ ]               Elbow flex/ext 2 10 [X]           [ ]           [ ]               Chest presses 2 10 [X]           [ ]           [ ]               Shoulder flex/ext 2 10 [X]           [ ]           [ ]                     [ ]           [ ]           [ ]                     [ ]           [ ]           [ ]                     [ ]           [ ]           [ ]                     [ ]           [ ]           [ ]                     [ ]           [ ]           [ ]                        Pain:  Pain Scale 1: Numeric (0 - 10)  Pain Intensity 1: 0                 Activity Tolerance:    No signs/symptoms of distress or discomfort during OT  Please refer to the flowsheet for vital signs taken during this treatment.   After treatment:   [X]  Patient left in no apparent distress sitting up in chair  [ ]  Patient left in no apparent distress in bed  [X]  Call bell left within reach  [X]  Nursing notified  [X]  Caregiver present  [ ]  Bed alarm activated      COMMUNICATION/COLLABORATION:   The patients plan of care was discussed with: Physical Therapy Assistant, Occupational Therapist and Registered Nurse     VERONICA Bryan  Time Calculation: 23 mins

## 2017-01-11 NOTE — PROGRESS NOTES
Problem: Patient Education: Go to Patient Education Activity  Goal: Patient/Family Education  PHYSICAL THERAPY TREATMENT  Patient: Gerardo Rojas (77 y.o. male)  Date: 1/11/2017  Diagnosis: right side weakness  Right sided weakness Hemispheric carotid artery syndrome       Precautions: Fall  Chart, physical therapy assessment, plan of care and goals were reviewed. ASSESSMENT:  Pt comes to stand with CGA to min assist.Pt ambulated 70ft with RW min assist.Pt is slightly unsteady. Pt left sitting up in chair. Pt is making progress with increased mobility today. Pt left sitting. Continue goals. Progression toward goals:  [ ]      Improving appropriately and progressing toward goals  [X]      Improving slowly and progressing toward goals  [ ]      Not making progress toward goals and plan of care will be adjusted       PLAN:  Patient continues to benefit from skilled intervention to address the above impairments. Continue treatment per established plan of care. Discharge Recommendations:  Rehab vs Skilled Nursing Facility  Further Equipment Recommendations for Discharge:  rolling walker       SUBJECTIVE:          OBJECTIVE DATA SUMMARY:   Critical Behavior:  Neurologic State: Alert  Orientation Level: Oriented X4  Cognition: Follows commands  Safety/Judgement: Awareness of environment, Fall prevention, Decreased insight into deficits  Functional Mobility Training:                       Transfers:  Sit to Stand: Contact guard assistance;Minimum assistance  Stand to Sit: Contact guard assistance;Minimum assistance                             Balance:  Sitting: Intact  Sitting - Static: Good (unsupported)  Standing: Intact; With support  Standing - Static: Fair;Good  Ambulation/Gait Training:  Distance (ft): 70 Feet (ft)  Assistive Device: Gait belt;Walker, rolling  Ambulation - Level of Assistance: Minimal assistance        Gait Abnormalities: Decreased step clearance              Speed/Amy: Pace decreased (<100 feet/min)  Step Length: Right shortened;Left shortened                 Pain:  Pain Scale 1: Numeric (0 - 10)  Pain Intensity 1: 0              Activity Tolerance:   Pt tolerated treatment well. Please refer to the flowsheet for vital signs taken during this treatment.   After treatment:   [X] Patient left in no apparent distress sitting up in chair  [ ] Patient left in no apparent distress in bed  [ ] Call bell left within reach  [ ] Nursing notified  [ ] Caregiver present  [ ] Bed alarm activated      COMMUNICATION/COLLABORATION:   The patients plan of care was discussed with: Physical Therapist     Taylor Denney PTA   Time Calculation: 23 mins

## 2017-01-11 NOTE — DIABETES MGMT
DTC Progress Note: Low Blood Glucose (less than 80)    Recommendations/ Comments:  Chart reviewed on Jerman Patton. for glucose less than 80 mg/dL. Brendan Morillo is a 73 y.o. male with a past medical history significant for Type 2 Diabetes per Dr. Ofelia Dodson H&P dated 01/06/2017. If appropriate, please consider   1. Decreasing mealtime insulin from 20 units to 15 units  2. Adding diabetic restrictions to current diet order    Hospital (inpatient) medications:  1. Correction Scale: Lispro (Humalog) Normal Sensitivity scale to cover for glucose > 139 mg/dL before meals and for glucose >199 at bedtime    2. Lantus 60 units daily   3. Humalog 20 units prior to each meal     Prior to admission medications: per past medical records  1. Lantus SOLOSTAR pen 60 units daily   2. Novolog Flexpen 20 units before breakfast, lunch and dinner        A1C:   Lab Results   Component Value Date/Time    Hemoglobin A1c 7.8 01/07/2017 06:28 AM     Reference range*:  Increased risk for diabetes: 5.7 - 6.4%  Diabetes: >6.4%  Glycemic control for adults with diabetes: <7.0 %    *JOSE ROBERTO TOLEDO (2014). Diagnosis and classification of diabetes mellitus. Diabetes care, 37, S81. Recent Glucose Results:   Lab Results   Component Value Date/Time    GLU 76 01/11/2017 07:01 AM    GLUCPOC 88 01/11/2017 07:45 AM    GLUCPOC 74 01/11/2017 07:16 AM    GLUCPOC 186 (H) 01/10/2017 09:16 PM      Lab Results   Component Value Date/Time    Creatinine 1.30 01/11/2017 07:01 AM     Active Orders   Diet    DIET CARDIAC Regular      PO intake: No data found. Thank you. Azeb Rosales.  Olinda Sharma, RN, BSN, MPH  Diabetes [address] [place] [address]  [phone]

## 2017-01-11 NOTE — PROGRESS NOTES
Bedside and Verbal shift change report given to [facility] (oncoming nurse) by Akil Day RN (offgoing nurse). Report included the following information SBAR, Kardex, Procedure Summary, MAR, Recent Results and Med Rec Status.

## 2017-01-12 VITALS
TEMPERATURE: 98 F | DIASTOLIC BLOOD PRESSURE: 54 MMHG | BODY MASS INDEX: 34.96 KG/M2 | WEIGHT: 236 LBS | RESPIRATION RATE: 16 BRPM | HEIGHT: 69 IN | OXYGEN SATURATION: 96 % | HEART RATE: 53 BPM | SYSTOLIC BLOOD PRESSURE: 109 MMHG

## 2017-01-12 LAB
ALBUMIN SERPL BCP-MCNC: 2.6 G/DL (ref 3.5–5)
ALBUMIN/GLOB SERPL: 0.7 {RATIO} (ref 1.1–2.2)
ALP SERPL-CCNC: 66 U/L (ref 45–117)
ALT SERPL-CCNC: 28 U/L (ref 12–78)
AMMONIA PLAS-SCNC: 68 UMOL/L
ANION GAP BLD CALC-SCNC: 6 MMOL/L (ref 5–15)
AST SERPL W P-5'-P-CCNC: 31 U/L (ref 15–37)
BASOPHILS # BLD AUTO: 0.1 K/UL (ref 0–0.1)
BASOPHILS # BLD: 1 % (ref 0–1)
BILIRUB SERPL-MCNC: 1.9 MG/DL (ref 0.2–1)
BUN SERPL-MCNC: 21 MG/DL (ref 6–20)
BUN/CREAT SERPL: 14 (ref 12–20)
CALCIUM SERPL-MCNC: 8.5 MG/DL (ref 8.5–10.1)
CHLORIDE SERPL-SCNC: 108 MMOL/L (ref 97–108)
CO2 SERPL-SCNC: 24 MMOL/L (ref 21–32)
CREAT SERPL-MCNC: 1.45 MG/DL (ref 0.7–1.3)
EOSINOPHIL # BLD: 0.4 K/UL (ref 0–0.4)
EOSINOPHIL NFR BLD: 6 % (ref 0–7)
ERYTHROCYTE [DISTWIDTH] IN BLOOD BY AUTOMATED COUNT: 14.4 % (ref 11.5–14.5)
GLOBULIN SER CALC-MCNC: 3.8 G/DL (ref 2–4)
GLUCOSE BLD STRIP.AUTO-MCNC: 113 MG/DL (ref 65–100)
GLUCOSE BLD STRIP.AUTO-MCNC: 228 MG/DL (ref 65–100)
GLUCOSE SERPL-MCNC: 122 MG/DL (ref 65–100)
HCT VFR BLD AUTO: 34.9 % (ref 36.6–50.3)
HGB BLD-MCNC: 11.7 G/DL (ref 12.1–17)
LYMPHOCYTES # BLD AUTO: 25 % (ref 12–49)
LYMPHOCYTES # BLD: 1.8 K/UL (ref 0.8–3.5)
MAGNESIUM SERPL-MCNC: 1.5 MG/DL (ref 1.6–2.4)
MCH RBC QN AUTO: 31.8 PG (ref 26–34)
MCHC RBC AUTO-ENTMCNC: 33.5 G/DL (ref 30–36.5)
MCV RBC AUTO: 94.8 FL (ref 80–99)
MONOCYTES # BLD: 1.1 K/UL (ref 0–1)
MONOCYTES NFR BLD AUTO: 16 % (ref 5–13)
NEUTS SEG # BLD: 3.8 K/UL (ref 1.8–8)
NEUTS SEG NFR BLD AUTO: 52 % (ref 32–75)
PLATELET # BLD AUTO: 78 K/UL (ref 150–400)
POTASSIUM SERPL-SCNC: 4.3 MMOL/L (ref 3.5–5.1)
PROT SERPL-MCNC: 6.4 G/DL (ref 6.4–8.2)
RBC # BLD AUTO: 3.68 M/UL (ref 4.1–5.7)
SERVICE CMNT-IMP: ABNORMAL
SERVICE CMNT-IMP: ABNORMAL
SODIUM SERPL-SCNC: 138 MMOL/L (ref 136–145)
TSH SERPL DL<=0.05 MIU/L-ACNC: 1.88 UIU/ML (ref 0.36–3.74)
WBC # BLD AUTO: 7.1 K/UL (ref 4.1–11.1)

## 2017-01-12 PROCEDURE — 74011250637 HC RX REV CODE- 250/637: Performed by: INTERNAL MEDICINE

## 2017-01-12 PROCEDURE — 83735 ASSAY OF MAGNESIUM: CPT | Performed by: SPECIALIST

## 2017-01-12 PROCEDURE — 80053 COMPREHEN METABOLIC PANEL: CPT | Performed by: FAMILY MEDICINE

## 2017-01-12 PROCEDURE — 82962 GLUCOSE BLOOD TEST: CPT

## 2017-01-12 PROCEDURE — 97116 GAIT TRAINING THERAPY: CPT

## 2017-01-12 PROCEDURE — 85025 COMPLETE CBC W/AUTO DIFF WBC: CPT | Performed by: FAMILY MEDICINE

## 2017-01-12 PROCEDURE — 84443 ASSAY THYROID STIM HORMONE: CPT | Performed by: SPECIALIST

## 2017-01-12 PROCEDURE — 74011636637 HC RX REV CODE- 636/637: Performed by: INTERNAL MEDICINE

## 2017-01-12 PROCEDURE — 97530 THERAPEUTIC ACTIVITIES: CPT

## 2017-01-12 PROCEDURE — 36415 COLL VENOUS BLD VENIPUNCTURE: CPT | Performed by: FAMILY MEDICINE

## 2017-01-12 PROCEDURE — 94660 CPAP INITIATION&MGMT: CPT

## 2017-01-12 PROCEDURE — 74011250637 HC RX REV CODE- 250/637: Performed by: FAMILY MEDICINE

## 2017-01-12 PROCEDURE — 82140 ASSAY OF AMMONIA: CPT | Performed by: FAMILY MEDICINE

## 2017-01-12 RX ORDER — INSULIN GLARGINE 100 [IU]/ML
60 INJECTION, SOLUTION SUBCUTANEOUS DAILY
Qty: 1 EACH | Refills: 0 | Status: SHIPPED
Start: 2017-01-12

## 2017-01-12 RX ORDER — SIMVASTATIN 40 MG/1
40 TABLET, FILM COATED ORAL
Qty: 30 TAB | Refills: 0 | Status: SHIPPED
Start: 2017-01-12

## 2017-01-12 RX ORDER — INSULIN ASPART 100 [IU]/ML
5 INJECTION, SOLUTION INTRAVENOUS; SUBCUTANEOUS
Qty: 1 PEN | Refills: 0 | Status: SHIPPED
Start: 2017-01-12

## 2017-01-12 RX ADMIN — DEXTRAN 70, AND HYPROMELLOSE 2910 2 DROP: 1; 3 SOLUTION/ DROPS OPHTHALMIC at 12:44

## 2017-01-12 RX ADMIN — RIFAXIMIN 550 MG: 550 TABLET ORAL at 08:14

## 2017-01-12 RX ADMIN — PANTOPRAZOLE SODIUM 40 MG: 40 TABLET, DELAYED RELEASE ORAL at 06:52

## 2017-01-12 RX ADMIN — INSULIN GLARGINE 60 UNITS: 100 INJECTION, SOLUTION SUBCUTANEOUS at 08:13

## 2017-01-12 RX ADMIN — Medication 10 ML: at 06:52

## 2017-01-12 RX ADMIN — LACTULOSE 20 G: 10 SOLUTION ORAL at 08:13

## 2017-01-12 RX ADMIN — CYCLOBENZAPRINE HYDROCHLORIDE 5 MG: 10 TABLET, FILM COATED ORAL at 12:44

## 2017-01-12 RX ADMIN — INSULIN LISPRO 20 UNITS: 100 INJECTION, SOLUTION INTRAVENOUS; SUBCUTANEOUS at 12:44

## 2017-01-12 RX ADMIN — INSULIN LISPRO 20 UNITS: 100 INJECTION, SOLUTION INTRAVENOUS; SUBCUTANEOUS at 08:14

## 2017-01-12 RX ADMIN — ASPIRIN 81 MG: 81 TABLET, COATED ORAL at 08:14

## 2017-01-12 RX ADMIN — INSULIN LISPRO 3 UNITS: 100 INJECTION, SOLUTION INTRAVENOUS; SUBCUTANEOUS at 12:44

## 2017-01-12 RX ADMIN — SPIRONOLACTONE 25 MG: 25 TABLET ORAL at 08:14

## 2017-01-12 NOTE — PROGRESS NOTES
Daily Progress Note and Discharge Note: 1/12/2017  Karin Phelps. Johan Blackman MD    Assessment/Plan:     Right side weakness / Hx-TIA (transient ischemic attack): weakness mild but now inconsistent exam.   --- ECHO ok  --- MRI. brain ok   ---Started asa - watch platelets   ---B9W 7.3%.  ---Neuro Consulted  ---PT/OT consulted  ---Repeat carotid dopplers ok with 0-49% bilat.      Essential hypertension:   ---stop BB for now       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:    ---Resume xifaxan, aldactone, lactulose, propranolol  ---Ammonia chronically elevated - Monitor/consult GI      Thrombocytopenia: stable, 2/2 liver disease. ---monitor       Depression:   ---resume lexapro       DM type 2 (diabetes mellitus, type 2): with renal complications.   ---Resume insulin with SSI  --- A1c 7.8     Vascular dementia: waxes and wanes - monitor      CKD (chronic kidney disease) stage 3: unclear baseline but does not appear to be worse. ---monitor at SNF      Bradycardia in the 40s  --- DCed Inderal for now, consulted Cards and cont to hold Inderal    .Worsening hepatic encephalopathy in the setting of elevated ammonia levels and history of cirrhosis 2/2 ALFARO, being treated with neurology consult, administration of lactulose and rifaximin and serial monitoring of serum ammonia levels.   Improved with increased Lactulose    T9 vertebral fx - likely from fall - not much in way of symptoms - ortho consulted and symptomatic tx only     Disp- Stable for SNF placement for rehab.      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  ICD-9-CM: 435.8  1/7/2017 - Present        CKD (chronic kidney disease) stage 3, GFR 30-59 ml/min ICD-10-CM: N18.3  ICD-9-CM: 585.3  1/6/2017 - Present        ARF (acute renal failure) (HCC) ICD-10-CM: N17.9  ICD-9-CM: 584.9  4/8/2016 - Present        Dizziness ICD-10-CM: R42  ICD-9-CM: 780.4  4/8/2016 - Present        LETY on CPAP (Chronic) ICD-10-CM: G47.33  ICD-9-CM: 327.23  4/8/2016 - Present        DM type 2 (diabetes mellitus, type 2) (HCC) (Chronic) ICD-10-CM: E11.9  ICD-9-CM: 250.00  1/16/2016 - Present        Vascular dementia (Chronic) ICD-10-CM: F01.50  ICD-9-CM: 290.40  1/16/2016 - Present        Hx-TIA (transient ischemic attack) (Chronic) ICD-10-CM: Z61.27  ICD-9-CM: V12.54  1/16/2016 - Present        Hyponatremia ICD-10-CM: E87.1  ICD-9-CM: 276.1  1/16/2016 - Present        Essential hypertension (Chronic) ICD-10-CM: I10  ICD-9-CM: 401.9  12/12/2013 - Present        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 [facility]) (Chronic) ICD-10-CM: K75.81, K74.60  ICD-9-CM: 571.8, 571.5  12/12/2013 - Present        Thrombocytopenia (HCC) (Chronic) ICD-10-CM: D69.6  ICD-9-CM: 287.5  12/12/2013 - Present        Depression (Chronic) ICD-10-CM: F32.9  ICD-9-CM: 806  12/12/2013 - Present        RESOLVED: Acute hepatic encephalopathy ([facility] [address] ICD-10-CM: K72.00  ICD-9-CM: 572.2  1/16/2016 - 4/8/2016        RESOLVED: Dehydration, moderate ICD-10-CM: E86.0  ICD-9-CM: 276.51  1/16/2016 - 4/8/2016        RESOLVED: Lactic acidosis ICD-10-CM: L30.4  ICD-9-CM: 276.2  1/16/2016 - 4/8/2016        RESOLVED: Metabolic encephalopathy GVL-22-KS: G93.41  ICD-9-CM: 348.31  8/19/2014 - 4/8/2016        RESOLVED: TIA (transient ischemic attack) ICD-10-CM: G45.9  ICD-9-CM: 435.9  12/12/2013 - 4/8/2016        RESOLVED: Slurred speech ICD-10-CM: R47.81  ICD-9-CM: 784.59  12/12/2013 - 4/8/2016        RESOLVED: Blurry vision, left eye ICD-10-CM: H53.8  ICD-9-CM: 368.8  12/12/2013 - 4/8/2016              HPI:   Mr. Lew Tesfaye is a 73 y.o. with h/o deprsesion, dm, htn, cirrhosis who presents with weakness. He was trying to eat cereal today when he noted right hand weakness. He was unable to pour his milk. The weakness has now resolved. He had TIA in the past, unclear why he is not on asa daily. He denies cp, sob, abd pain (Dr Justin Kingsley)    1/7/17: His right arm and hand weakness has improved. He was not on a statin or ASA prior to admission. LDL is 60 though. His sister reports that he lives with his wife who is not in good health. He has had multiple falls in the past few weeks usually at night when he needs to get up to the [address] He is slow to answer questions this morning. 1/8: Had a bad night. Didn't sleep well last night. HR has been slow so will decrease his Inderal to 20mg BID. Appreciate neuro consult. Statin was recommended so will order this. 1/9:  He is more somnolent today and less oriented today. Ammonia level is up and family is concerned so will consult GI also. Remains bradycardic so will reduce Inderal to 10 mg BID and may have to reduce further. 1/10:  Little change. GI has seen and increased Lactulose. Neuro has seen and agrees worsening is due to hepatic encephalopathy. Remains bradycardic - will DC inderal and watch - if not better may need pacer. Ammonia down to 79 today. T9 fx seen on MRI - ortho consulted for opinion. 1/11:  He is much more alert and oriented this AM.  Complains only of \"just weak all over. \"  He knows he will need [facility] re[facility]. Rate still down despite DC of Inderal - will ask Card to see. 1/12:  Pulse improved only to low 50s off Inderal - cards has seen and will cont to hold BB. Ammonia down to 68. Mental status improved with lower Ammonia. No complaints this AM.  He had a near fall yesterday due to weakness - stable for SNF. Review of Systems:   A comprehensive review of systems was negative except for that written in the HPI.     Objective:   Physical Exam:     Visit Vitals    /54 (BP 1 Location: Left arm, BP Patient Position: At rest)    Pulse (!) 52    Temp 98 °F (36.7 °C)    Resp 16    Ht 5' 9.25\" (1.759 m)    Wt 107 kg (236 lb)    SpO2 99%    BMI 34.6 kg/m2      O2 Device: CPAP mask    Temp (24hrs), Av.2 °F (36.8 °C), Min:97.8 °F (36.6 °C), Max:98.5 °F (36.9 °C)    1901 - 700  In: -   Out: 200 [Urine:200]   01/10 0701 - 1900  In: -   Out: 880 [Urine:880]    General:  Alert, cooperative but slow, no distress, appears stated age. Head:  Normocephalic, atraumatic. Eyes:  Conjunctivae/corneas clear. EOMI   Nose: Nares normal.  Wearing CPAP   Throat: Lips, mucosa, and tongue moist.    Neck: Supple, symmetrical, trachea midline, no adenopathy, thyroid: no enlargement/tenderness/nodules, no carotid bruit and no JVD. Lungs:   Clear to auscultation bilaterally. Heart:  Regular rhythm, Rate bradycardic. S1, S2 normal, no murmur, click, rub or gallop. Abdomen:   Soft, non-tender. Non distended, Bowel sounds normal. No masses,  No organomegaly. Extremities: Extremities with trace to 1+ edema. No calf tenderness or cords. Skin: Skin color, texture, turgor normal. No rashes or lesions   Neurologic:   Alert and oriented X 2-3.  responding well to commands today. not consistent with strength testing still. No cogwheeling or rigidity. Gait not tested at this time. Sensation grossly normal to touch. Data Review:    Carotid Dopplers 17: IMPRESSION: Findings are consistent with 0-49% range of stenosis of  the right internal carotid and 0-49% range of stenosis of the left  internal carotid. Vertebrals are patent with antegrade flow    BRAIN MRI WITHOUT CONTRAST: 2017 8:55 PM  INDICATION: Cerebrovascular accident. COMPARISON: 2016, same day CT head. TECHNIQUE: Images were obtained in multiple planes and sequences to emphasize  T1, T2, and T2* information.  In addition, diffusion-weighted images and ADC maps  were also obtained. FINDINGS: The ventricles and sulci are appropriate for age. The main  intracranial flow-voids are normal. Scattered periventricular and subcortical  white matter signal abnormalities are consistent with chronic small vessel  ischemic disease. No restricted diffusion to suggest acute infarction. Again  noted is chronic opacification of the right maxillary sinus with expansion of  the lateral maxillary sinus wall. IMPRESSION  IMPRESSION: No acute intracranial abnormality. Recent Days:  Recent Labs      01/12/17   0421  01/11/17   0701  01/10/17   0441   WBC  7.1  7.0  5.9   HGB  11.7*  11.9*  11.7*   HCT  34.9*  35.0*  35.5*   PLT  78*  66*  72*     Recent Labs      01/12/17   0421  01/11/17   0701  01/10/17   0441   NA  138  140  142   K  4.3  4.7  3.9   CL  108  109*  108   CO2  24  24  23   GLU  122*  76  91   BUN  21*  17  18   CREA  1.45*  1.30  1.47*   CA  8.5  8.8  8.6   ALB  2.6*  2.4*  2.5*   TBILI  1.9*  1.5*  1.9*   SGOT  31  39*  38*   ALT  28  29  29     No results for input(s): PH, PCO2, PO2, HCO3, FIO2 in the last 72 hours. 24 Hour Results:  Recent Results (from the past 24 hour(s))   CBC WITH AUTOMATED DIFF    Collection Time: 01/11/17  7:01 AM   Result Value Ref Range    WBC 7.0 4.1 - 11.1 K/uL    RBC 3.60 (L) 4.10 - 5.70 M/uL    HGB 11.9 (L) 12.1 - 17.0 g/dL    HCT 35.0 (L) 36.6 - 50.3 %    MCV 97.2 80.0 - 99.0 FL    MCH 33.1 26.0 - 34.0 PG    MCHC 34.0 30.0 - 36.5 g/dL    RDW 14.5 11.5 - 14.5 %    PLATELET 66 (L) 023 - 400 K/uL    NEUTROPHILS 50 32 - 75 %    LYMPHOCYTES 27 12 - 49 %    MONOCYTES 15 (H) 5 - 13 %    EOSINOPHILS 7 0 - 7 %    BASOPHILS 1 0 - 1 %    ABS. NEUTROPHILS 3.5 1.8 - 8.0 K/UL    ABS. LYMPHOCYTES 1.9 0.8 - 3.5 K/UL    ABS. MONOCYTES 1.1 (H) 0.0 - 1.0 K/UL    ABS. EOSINOPHILS 0.5 (H) 0.0 - 0.4 K/UL    ABS.  BASOPHILS 0.1 0.0 - 0.1 K/UL   METABOLIC PANEL, COMPREHENSIVE    Collection Time: 01/11/17  7:01 AM   Result Value Ref Range    Sodium 140 136 - 145 mmol/L Potassium 4.7 3.5 - 5.1 mmol/L    Chloride 109 (H) 97 - 108 mmol/L    CO2 24 21 - 32 mmol/L    Anion gap 7 5 - 15 mmol/L    Glucose 76 65 - 100 mg/dL    BUN 17 6 - 20 MG/DL    Creatinine 1.30 0.70 - 1.30 MG/DL    BUN/Creatinine ratio 13 12 - 20      GFR est AA >60 >60 ml/min/1.73m2    GFR est non-AA 55 (L) >60 ml/min/1.73m2    Calcium 8.8 8.5 - 10.1 MG/DL    Bilirubin, total 1.5 (H) 0.2 - 1.0 MG/DL    ALT 29 12 - 78 U/L    AST 39 (H) 15 - 37 U/L    Alk.  phosphatase 63 45 - 117 U/L    Protein, total 6.2 (L) 6.4 - 8.2 g/dL    Albumin 2.4 (L) 3.5 - 5.0 g/dL    Globulin 3.8 2.0 - 4.0 g/dL    A-G Ratio 0.6 (L) 1.1 - 2.2     GLUCOSE, POC    Collection Time: 01/11/17  7:16 AM   Result Value Ref Range    Glucose (POC) 74 65 - 100 mg/dL    Performed by Muangel Caal (PCT)    GLUCOSE, POC    Collection Time: 01/11/17  7:45 AM   Result Value Ref Range    Glucose (POC) 88 65 - 100 mg/dL    Performed by Umangel Caal (PCT)    GLUCOSE, POC    Collection Time: 01/11/17 11:27 AM   Result Value Ref Range    Glucose (POC) 307 (H) 65 - 100 mg/dL    Performed by Mu Caal (PCT)    GLUCOSE, POC    Collection Time: 01/11/17  4:24 PM   Result Value Ref Range    Glucose (POC) 173 (H) 65 - 100 mg/dL    Performed by [address] [place] [address], POC    Collection Time: 01/11/17  6:54 PM   Result Value Ref Range    Glucose (POC) 131 (H) 65 - 100 mg/dL    Performed by [facility]    GLUCOSE, POC    Collection Time: 01/11/17  9:34 PM   Result Value Ref Range    Glucose (POC) 227 (H) 65 - 100 mg/dL    Performed by Jono Machuca ([facility])    AMMONIA    Collection Time: 01/12/17  4:21 AM   Result Value Ref Range    Ammonia 68 (H) <32 UMOL/L   CBC WITH AUTOMATED DIFF    Collection Time: 01/12/17  4:21 AM   Result Value Ref Range    WBC 7.1 4.1 - 11.1 K/uL    RBC 3.68 (L) 4.10 - 5.70 M/uL    HGB 11.7 (L) 12.1 - 17.0 g/dL    HCT 34.9 (L) 36.6 - 50.3 %    MCV 94.8 80.0 - 99.0 FL    MCH 31.8 26.0 - 34.0 PG    MCHC 33.5 30.0 - 36.5 g/dL RDW 14.4 11.5 - 14.5 %    PLATELET 78 (L) 847 - 400 K/uL    NEUTROPHILS 52 32 - 75 %    LYMPHOCYTES 25 12 - 49 %    MONOCYTES 16 (H) 5 - 13 %    EOSINOPHILS 6 0 - 7 %    BASOPHILS 1 0 - 1 %    ABS. NEUTROPHILS 3.8 1.8 - 8.0 K/UL    ABS. LYMPHOCYTES 1.8 0.8 - 3.5 K/UL    ABS. MONOCYTES 1.1 (H) 0.0 - 1.0 K/UL    ABS. EOSINOPHILS 0.4 0.0 - 0.4 K/UL    ABS. BASOPHILS 0.1 0.0 - 0.1 K/UL   METABOLIC PANEL, COMPREHENSIVE    Collection Time: 01/12/17  4:21 AM   Result Value Ref Range    Sodium 138 136 - 145 mmol/L    Potassium 4.3 3.5 - 5.1 mmol/L    Chloride 108 97 - 108 mmol/L    CO2 24 21 - 32 mmol/L    Anion gap 6 5 - 15 mmol/L    Glucose 122 (H) 65 - 100 mg/dL    BUN 21 (H) 6 - 20 MG/DL    Creatinine 1.45 (H) 0.70 - 1.30 MG/DL    BUN/Creatinine ratio 14 12 - 20      GFR est AA 58 (L) >60 ml/min/1.73m2    GFR est non-AA 48 (L) >60 ml/min/1.73m2    Calcium 8.5 8.5 - 10.1 MG/DL    Bilirubin, total 1.9 (H) 0.2 - 1.0 MG/DL    ALT 28 12 - 78 U/L    AST 31 15 - 37 U/L    Alk.  phosphatase 66 45 - 117 U/L    Protein, total 6.4 6.4 - 8.2 g/dL    Albumin 2.6 (L) 3.5 - 5.0 g/dL    Globulin 3.8 2.0 - 4.0 g/dL    A-G Ratio 0.7 (L) 1.1 - 2.2         Medications reviewed  Current Facility-Administered Medications   Medication Dose Route Frequency    simvastatin (ZOCOR) tablet 40 mg  40 mg Oral QHS    aspirin delayed-release tablet 81 mg  81 mg Oral DAILY    escitalopram oxalate (LEXAPRO) tablet 20 mg  20 mg Oral QHS    lactulose (CHRONULAC) solution 20 g  20 g Oral TID    pantoprazole (PROTONIX) tablet 40 mg  40 mg Oral ACB    rifAXIMin (XIFAXAN) tablet 550 mg  550 mg Oral BID    spironolactone (ALDACTONE) tablet 25 mg  25 mg Oral DAILY    cyclobenzaprine (FLEXERIL) tablet 5 mg  5 mg Oral TID PRN    insulin lispro (HUMALOG) injection 20 Units  20 Units SubCUTAneous TIDAC    insulin glargine (LANTUS) injection 60 Units  60 Units SubCUTAneous DAILY    sodium chloride (NS) flush 5-10 mL  5-10 mL IntraVENous Q8H    sodium chloride (NS) flush 5-10 mL  5-10 mL IntraVENous PRN    glucose chewable tablet 16 g  4 Tab Oral PRN    dextrose (D50W) injection syrg 12.5-25 g  12.5-25 g IntraVENous PRN    glucagon (GLUCAGEN) injection 1 mg  1 mg IntraMUSCular PRN    insulin lispro (HUMALOG) injection   SubCUTAneous QID WITH MEALS       Care Plan discussed with: Patient and Nurse    Total time spent with patient and review of records: 30 minutes.     Ugo Box MD

## 2017-01-12 NOTE — PROGRESS NOTES
Bedside and Verbal shift change report given to [facility] (oncoming nurse) by Alina Martinez (offgoing nurse). Report included the following information SBAR, Kardex, Procedure Summary, MAR, Recent Results and Med Rec Status.

## 2017-01-12 NOTE — PROGRESS NOTES
Problem: Mobility Impaired (Adult and Pediatric)  Goal: *Acute Goals and Plan of Care (Insert Text)  Physical Therapy Goals  Initiated 1/7/2017  1. Patient will move from supine to sit and sit to supine in bed with independence within 7 day(s). 2. Patient will transfer from bed to chair and chair to bed with independence using the least restrictive device within 7 day(s). 3. Patient will perform sit to stand with independence within 7 day(s). 4. Patient will ambulate with modified independence for 150 feet with the least restrictive device within 7 day(s). PHYSICAL THERAPY TREATMENT  Patient: Otilia Valdivia (77 y.o. male)  Date: 1/12/2017  Diagnosis: right side weakness  Right sided weakness Hemispheric carotid artery syndrome       Precautions: Fall  Chart, physical therapy assessment, plan of care and goals were reviewed. ASSESSMENT:  Pt comes to stand with CGA. Pt ambulated 80ft with RW CGA to min assist.Pt with increased lateral sway and needs assist directing RW through turns. Pt left sitting. Pt progressing slowly. Continue goals. Progression toward goals:  [ ]      Improving appropriately and progressing toward goals  [X]      Improving slowly and progressing toward goals  [ ]      Not making progress toward goals and plan of care will be adjusted       PLAN:  Patient continues to benefit from skilled intervention to address the above impairments. Continue treatment per established plan of care.   Discharge Recommendations:  Juan C Coats  Further Equipment Recommendations for Discharge:  rolling walker       SUBJECTIVE:          OBJECTIVE DATA SUMMARY:   Critical Behavior:  Neurologic State: Alert  Orientation Level: Oriented X4  Cognition: Appropriate decision making, Appropriate for age attention/concentration, Appropriate safety awareness, Follows commands  Safety/Judgement: Awareness of environment, Fall prevention, Decreased insight into deficits  Functional Mobility Training: Transfers:  Sit to Stand: Contact guard assistance  Stand to Sit: Contact guard assistance                             Balance:  Sitting: Intact  Sitting - Static: Good (unsupported)  Standing: Intact; Without support  Ambulation/Gait Training:  Distance (ft): 80 Feet (ft)  Assistive Device: Gait belt;Walker, rolling  Ambulation - Level of Assistance: Minimal assistance                 Base of Support: Narrowed     Speed/Amy: Pace decreased (<100 feet/min)  Step Length: Left shortened;Right shortened                 Pain:  Pain Scale 1: Numeric (0 - 10)  Pain Intensity 1: 0              Activity Tolerance:   Pt tolerated treatment well. Please refer to the flowsheet for vital signs taken during this treatment.   After treatment:   [X] Patient left in no apparent distress sitting up in chair  [ ] Patient left in no apparent distress in bed  [ ] Call bell left within reach  [ ] Nursing notified  [ ] Caregiver present  [ ] Bed alarm activated      COMMUNICATION/COLLABORATION:   The patients plan of care was discussed with: Physical Therapist     Severiano Ip, PTA   Time Calculation: 23 mins

## 2017-01-12 NOTE — DISCHARGE SUMMARY
Physician Discharge Summary     Patient ID:    Kristan Gotti  [id number]  73 y.o.  1945  Dominique Calhoun MD    Admit date: 1/6/2017    Discharge date and time: 1/12/2017    Admission Diagnoses: right side weakness;Right sided weakness    Chronic Diagnoses:    Problem List as of 1/12/2017  Date Reviewed: 1/12/2017          Codes Class Noted - Resolved    Increased ammonia level ICD-10-CM: R79.89  ICD-9-CM: 790.6  1/9/2017 - Present        Cerebral microvascular disease ICD-10-CM: I67.9  ICD-9-CM: 437.9  1/9/2017 - Present        Right sided weakness ICD-10-CM: M62.81  ICD-9-CM: 728.87  1/9/2017 - Present        * (Principal)Hemispheric carotid artery syndrome ICD-10-CM: G45.1  ICD-9-CM: 435.8  1/7/2017 - Present        CKD (chronic kidney disease) stage 3, GFR 30-59 ml/min ICD-10-CM: N18.3  ICD-9-CM: 585.3  1/6/2017 - Present        ARF (acute renal failure) ([facility] [address] ICD-10-CM: N17.9  ICD-9-CM: 584.9  4/8/2016 - Present        Dizziness ICD-10-CM: R42  ICD-9-CM: 780.4  4/8/2016 - Present        LETY on CPAP (Chronic) ICD-10-CM: G47.33  ICD-9-CM: 327.23  4/8/2016 - Present        DM type 2 (diabetes mellitus, type 2) (HCC) (Chronic) ICD-10-CM: E11.9  ICD-9-CM: 250.00  1/16/2016 - Present        Vascular dementia (Chronic) ICD-10-CM: F01.50  ICD-9-CM: 290.40  1/16/2016 - Present        Hx-TIA (transient ischemic attack) (Chronic) ICD-10-CM: Z86.73  ICD-9-CM: V12.54  1/16/2016 - Present        Hyponatremia ICD-10-CM: E87.1  ICD-9-CM: 276.1  1/16/2016 - Present        Essential hypertension (Chronic) ICD-10-CM: I10  ICD-9-CM: 401.9  12/12/2013 - Present        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 ([facility] (Chronic) ICD-10-CM: K75.81, K74.60  ICD-9-CM: 571.8, 571.5  12/12/2013 - Present        Thrombocytopenia (HCC) (Chronic) ICD-10-CM: D69.6  ICD-9-CM: 287.5  12/12/2013 - Present        Depression (Chronic) ICD-10-CM: F32.9  ICD-9-CM: 020  12/12/2013 - Present        RESOLVED: Acute hepatic encephalopathy ([facility] ICD-10-CM: K72.00  ICD-9-CM: 572.2  1/16/2016 - 4/8/2016        RESOLVED: Dehydration, moderate ICD-10-CM: E86.0  ICD-9-CM: 276.51  1/16/2016 - 4/8/2016        RESOLVED: Lactic acidosis ICD-10-CM: F07.4  ICD-9-CM: 276.2  1/16/2016 - 4/8/2016        RESOLVED: Metabolic encephalopathy T-46-W: G93.41  ICD-9-CM: 348.31  8/19/2014 - 4/8/2016        RESOLVED: TIA (transient ischemic attack) ICD-10-CM: G45.9  ICD-9-CM: 435.9  12/12/2013 - 4/8/2016        RESOLVED: Slurred speech ICD-10-CM: R47.81  ICD-9-CM: 784.59  12/12/2013 - 4/8/2016        RESOLVED: Blurry vision, left eye ICD-10-CM: H53.8  ICD-9-CM: 368.8  12/12/2013 - 4/8/2016              Discharge Medications:   Current Discharge Medication List      START taking these medications    Details   simvastatin (ZOCOR) 40 mg tablet Take 1 Tab by mouth nightly. Qty: 30 Tab, Refills: 0         CONTINUE these medications which have CHANGED    Details   insulin aspart (NOVOLOG) 100 unit/mL inpn 0.05 mL by SubCUTAneous route Before breakfast, lunch, and dinner. Qty: 1 Pen, Refills: 0      insulin glargine (LANTUS SOLOSTAR) 100 unit/mL (3 mL) pen 60 Units by SubCUTAneous route daily. Qty: 1 Each, Refills: 0         CONTINUE these medications which have NOT CHANGED    Details   aspirin delayed-release 81 mg tablet Take 81 mg by mouth daily. magnesium oxide (MAG-OX) 400 mg tablet Take 1 Tab by mouth two (2) times a day. Qty: 1 Tab, Refills: 0      rifaximin (XIFAXAN) 550 mg tablet Take 1 Tab by mouth two (2) times a day. Qty: 1 Tab, Refills: 0      spironolactone (ALDACTONE) 25 mg tablet Take 25 mg by mouth daily. cyclobenzaprine (FLEXERIL) 10 mg tablet Take 5-10 mg by mouth three (3) times daily as needed for Muscle Spasm(s). lactulose (CHRONULAC) 10 gram/15 mL solution Take 20 g by mouth three (3) times daily. omeprazole (PRILOSEC) 40 mg capsule Take 40 mg by mouth daily. escitalopram (LEXAPRO) 20 mg tablet Take 20 mg by mouth nightly. albuterol (VENTOLIN HFA) 90 mcg/actuation inhaler Take 1 Puff by inhalation every four (4) hours as needed for Wheezing or Shortness of Breath. STOP taking these medications       propranolol (INDERAL) 40 mg tablet Comments:   Reason for Stopping: Follow up Care:    1. Martyn Buerger, MD with in 1 weeks  2.  specialists as directed. Diet:  Diabetic Diet    Disposition:  SNF. Advanced Directive:    Discharge Exam:  [See today's progress note.]    CONSULTATIONS: Cardiology and GI    Significant Diagnostic Studies:   Recent Labs      01/12/17 0421 01/11/17 0701   WBC  7.1  7.0   HGB  11.7*  11.9*   HCT  34.9*  35.0*   PLT  78*  66*     Recent Labs      01/12/17   0421  01/11/17   0701  01/10/17   0441   NA  138  140  142   K  4.3  4.7  3.9   CL  108  109*  108   CO2  24  24  23   BUN  21*  17  18   CREA  1.45*  1.30  1.47*   GLU  122*  76  91   CA  8.5  8.8  8.6   MG  1.5*   --    --      Recent Labs      01/12/17   0421  01/11/17   0701  01/10/17   0441   SGOT  31  39*  38*   ALT  28  29  29   AP  66  63  53   TBILI  1.9*  1.5*  1.9*   TP  6.4  6.2*  6.2*   ALB  2.6*  2.4*  2.5*   GLOB  3.8  3.8  3.7     No results for input(s): INR, PTP, APTT in the last 72 hours. No lab exists for component: INREXT   No results for input(s): FE, TIBC, PSAT, FERR in the last 72 hours. No results for input(s): PH, PCO2, PO2 in the last 72 hours. No results for input(s): CPK, CKMB in the last 72 hours. No lab exists for component: TROPONINI  Lab Results   Component Value Date/Time    Glucose (POC) 228 01/12/2017 11:17 AM    Glucose (POC) 113 01/12/2017 07:10 AM    Glucose (POC) 227 01/11/2017 09:34 PM    Glucose (POC) 131 01/11/2017 06:54 PM    Glucose (POC) 173 01/11/2017 04:24 PM    Glucose (POC) 184 08/19/2014 07:44 PM     Lab Results   Component Value Date/Time    TSH 1.88 01/12/2017 04:21 AM    T4, Free 1.09 01/11/2016 11:59 AM             HOSPITAL COURSE & TREATMENT RENDERED:   1.  See today's progress note:    Daily Progress Note and Discharge Note: 1/12/2017  Minor Larve. Omari Ferreira MD    Assessment/Plan:     Right side weakness / Hx-TIA (transient ischemic attack): weakness mild but now inconsistent exam.   --- ECHO ok  --- MRI. brain ok   ---Started asa - watch platelets   ---W5G 7.4%.  ---Neuro Consulted  ---PT/OT consulted  ---Repeat carotid dopplers ok with 0-49% bilat.      Essential hypertension:   ---stop BB for now       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:    ---Resume xifaxan, aldactone, lactulose, propranolol  ---Ammonia chronically elevated - Monitor/consult GI      Thrombocytopenia: stable, 2/2 liver disease. ---monitor       Depression:   ---resume lexapro       DM type 2 (diabetes mellitus, type 2): with renal complications.   ---Resume insulin with SSI  --- A1c 7.8     Vascular dementia: waxes and wanes - monitor      CKD (chronic kidney disease) stage 3: unclear baseline but does not appear to be worse. ---monitor at SNF      Bradycardia in the 40s  --- DCed Inderal for now, consulted Cards and cont to hold Inderal    .Worsening hepatic encephalopathy in the setting of elevated ammonia levels and history of cirrhosis 2/2 ALFARO, being treated with neurology consult, administration of lactulose and rifaximin and serial monitoring of serum ammonia levels.   Improved with increased Lactulose    T9 vertebral fx - likely from fall - not much in way of symptoms - ortho consulted and symptomatic tx only     Disp- Stable for SNF placement for rehab.      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  ICD-9-CM: 435.8  1/7/2017 - Present        CKD (chronic kidney disease) stage 3, GFR 30-59 ml/min ICD-10-CM: N18.3  ICD-9-CM: 585.3  1/6/2017 - Present        ARF (acute renal failure) (HCC) ICD-10-CM: N17.9  ICD-9-CM: 584.9  4/8/2016 - Present        Dizziness ICD-10-CM: R42  ICD-9-CM: 780.4  4/8/2016 - Present        LETY on CPAP (Chronic) ICD-10-CM: G47.33  ICD-9-CM: 327.23  4/8/2016 - Present        DM type 2 (diabetes mellitus, type 2) (HCC) (Chronic) ICD-10-CM: E11.9  ICD-9-CM: 250.00  1/16/2016 - Present        Vascular dementia (Chronic) ICD-10-CM: F01.50  ICD-9-CM: 290.40  1/16/2016 - Present        Hx-TIA (transient ischemic attack) (Chronic) ICD-10-CM: W46.60  ICD-9-CM: V12.54  1/16/2016 - Present        Hyponatremia ICD-10-CM: E87.1  ICD-9-CM: 276.1  1/16/2016 - Present        Essential hypertension (Chronic) ICD-10-CM: I10  ICD-9-CM: 401.9  12/12/2013 - Present        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 [facility]) (Chronic) ICD-10-CM: K75.81, K74.60  ICD-9-CM: 571.8, 571.5  12/12/2013 - Present        Thrombocytopenia (HCC) (Chronic) ICD-10-CM: D69.6  ICD-9-CM: 287.5  12/12/2013 - Present        Depression (Chronic) ICD-10-CM: F32.9  ICD-9-CM: 339  12/12/2013 - Present        RESOLVED: Acute hepatic encephalopathy ([facility] ICD-10-CM: K72.00  ICD-9-CM: 572.2  1/16/2016 - 4/8/2016        RESOLVED: Dehydration, moderate ICD-10-CM: E86.0  ICD-9-CM: 276.51  1/16/2016 - 4/8/2016        RESOLVED: Lactic acidosis ICD-10-CM: K34.4  ICD-9-CM: 276.2  1/16/2016 - 4/8/2016        RESOLVED: Metabolic encephalopathy ZII-21-PY: G93.41  ICD-9-CM: 348.31  8/19/2014 - 4/8/2016        RESOLVED: TIA (transient ischemic attack) ICD-10-CM: G45.9  ICD-9-CM: 435.9  12/12/2013 - 4/8/2016        RESOLVED: Slurred speech ICD-10-CM: R47.81  ICD-9-CM: 784.59  12/12/2013 - 4/8/2016        RESOLVED: Blurry vision, left eye ICD-10-CM: H53.8  ICD-9-CM: 368.8  12/12/2013 - 4/8/2016 HPI: Mr. Benjamin Cooper is a 73 y.o. with h/o deprsesion, dm, htn, cirrhosis who presents with weakness. He was trying to eat cereal today when he noted right hand weakness. He was unable to pour his milk. The weakness has now resolved. He had TIA in the past, unclear why he is not on asa daily. He denies cp, sob, abd pain (Dr Ramesh Galvez)    1/7/17: His right arm and hand weakness has improved. He was not on a statin or ASA prior to admission. LDL is 60 though. His sister reports that he lives with his wife who is not in good health. He has had multiple falls in the past few weeks usually at night when he needs to get up to the [address] He is slow to answer questions this morning. 1/8: Had a bad night. Didn't sleep well last night. HR has been slow so will decrease his Inderal to 20mg BID. Appreciate neuro consult. Statin was recommended so will order this. 1/9:  He is more somnolent today and less oriented today. Ammonia level is up and family is concerned so will consult GI also. Remains bradycardic so will reduce Inderal to 10 mg BID and may have to reduce further. 1/10:  Little change. GI has seen and increased Lactulose. Neuro has seen and agrees worsening is due to hepatic encephalopathy. Remains bradycardic - will DC inderal and watch - if not better may need pacer. Ammonia down to 79 today. T9 fx seen on MRI - ortho consulted for opinion. 1/11:  He is much more alert and oriented this AM.  Complains only of \"just weak all over. \"  He knows he will need [facility] re[facility]. Rate still down despite DC of Inderal - will ask Card to see. 1/12:  Pulse improved only to low 50s off Inderal - cards has seen and will cont to hold BB. Ammonia down to 68. Mental status improved with lower Ammonia. No complaints this AM.  He had a near fall yesterday due to weakness - stable for SNF.       Review of Systems:   A comprehensive review of systems was negative except for that written in the HPI.    Objective:   Physical Exam:     Visit Vitals    /54 (BP 1 Location: Left arm, BP Patient Position: At rest)    Pulse (!) 52    Temp 98 °F (36.7 °C)    Resp 16    Ht 5' 9.25\" (1.759 m)    Wt 107 kg (236 lb)    SpO2 99%    BMI 34.6 kg/m2      O2 Device: CPAP mask    Temp (24hrs), Av.2 °F (36.8 °C), Min:97.8 °F (36.6 °C), Max:98.5 °F (36.9 °C)    1901 -  0700  In: -   Out: 200 [Urine:200]   01/10 0701 -  190  In: -   Out: 880 [Urine:880]    General:  Alert, cooperative but slow, no distress, appears stated age. Head:  Normocephalic, atraumatic. Eyes:  Conjunctivae/corneas clear. EOMI   Nose: Nares normal.  Wearing CPAP   Throat: Lips, mucosa, and tongue moist.    Neck: Supple, symmetrical, trachea midline, no adenopathy, thyroid: no enlargement/tenderness/nodules, no carotid bruit and no JVD. Lungs:   Clear to auscultation bilaterally. Heart:  Regular rhythm, Rate bradycardic. S1, S2 normal, no murmur, click, rub or gallop. Abdomen:   Soft, non-tender. Non distended, Bowel sounds normal. No masses,  No organomegaly. Extremities: Extremities with trace to 1+ edema. No calf tenderness or cords. Skin: Skin color, texture, turgor normal. No rashes or lesions   Neurologic:   Alert and oriented X 2-3.  responding well to commands today. not consistent with strength testing still. No cogwheeling or rigidity. Gait not tested at this time. Sensation grossly normal to touch. Data Review:    Carotid Dopplers 17: IMPRESSION: Findings are consistent with 0-49% range of stenosis of  the right internal carotid and 0-49% range of stenosis of the left  internal carotid. Vertebrals are patent with antegrade flow    BRAIN MRI WITHOUT CONTRAST: 2017 8:55 PM  INDICATION: Cerebrovascular accident. COMPARISON: 2016, same day CT head. TECHNIQUE: Images were obtained in multiple planes and sequences to emphasize  T1, T2, and T2* information.  In addition, diffusion-weighted images and ADC maps  were also obtained. FINDINGS: The ventricles and sulci are appropriate for age. The main  intracranial flow-voids are normal. Scattered periventricular and subcortical  white matter signal abnormalities are consistent with chronic small vessel  ischemic disease. No restricted diffusion to suggest acute infarction. Again  noted is chronic opacification of the right maxillary sinus with expansion of  the lateral maxillary sinus wall. IMPRESSION  IMPRESSION: No acute intracranial abnormality.       Signed:  Fiona Mayberry MD  1/12/2017  12:34 PM

## 2017-01-12 NOTE — PROGRESS NOTES
NUTRITION   RD Screen      Diet: Cardiac  Body mass index is 34.6 kg/(m^2). Skin: intact  PO Intake:   No data found. Estimated Daily Nutrition Requirements:  Kcals:       8058 - 7874 ( 20-25 kcals/kg/UBW)           Protein:      95 ( 1gm/kg/UBW)            Fluid:   1ml/kcal or per MD    Pt screened for nutrition risk d/t LOS. Pt is tolerating po diet very well w/ adequate po intake and meeting estimated nutrition needs. He reports his doctor gave him a fluid pill prior to admission and he lost 21 lbs in less than 1 month. States he eats low salt at home. Reports good appetite and is eating well. Encouraged to continue low salt diet after discharge. Weight Metrics 1/12/2017 4/12/2016 1/27/2016 1/15/2016 1/6/2016 10/4/2015 8/19/2014   Weight 236 lb 257 lb 4.4 oz 250 lb 236 lb 256 lb 258 lb 285 lb   BMI 34.6 kg/m2 39.13 kg/m2 35.87 kg/m2 34.6 kg/m2 37.79 kg/m2 38.08 kg/m2 41.5 kg/m2         Nutrition Diagnosis: n/a  Nutrition Intervention: n/a  Goal: n/a  Monitoring and Evaluation: n/a    No nutrition risk identified at this time.    RD will continue to monitor and will f/u for further nutrition assessment and intervention as appropriate    Education & Discharge Needs:   [] Nutrition related discharge needs addressed:     [] Supplements (on d/c instruction &/or coupons provided)  [] Education    [x]No nutrition related discharge needs at this time     Cultural, [employer] and ethnic food preferences identified    [x]None   [] Yes     Eleni Hollis RD

## 2017-01-12 NOTE — PROGRESS NOTES
Bedside and Verbal shift change report given to [address] [place] [address] Drive (oncoming nurse) by Iain Cabrera RN (offgoing nurse). Report included the following information SBAR, Kardex, Procedure Summary, Intake/Output, MAR and Recent Results.

## 2017-01-12 NOTE — DISCHARGE INSTRUCTIONS
Patient Discharge Instructions    Claudetta Holiday / [id number] : 1945    Admitted 2017 Discharged: 2017 12:34 PM     ACUTE DIAGNOSES:  right side weakness  Right sided weakness    CHRONIC MEDICAL DIAGNOSES:  Problem List as of 2017  Date Reviewed: 2017          Codes Class Noted - Resolved    Increased ammonia level ICD-10-CM: R79.89  ICD-9-CM: 790.6  2017 - Present        Cerebral microvascular disease ICD-10-CM: I67.9  ICD-9-CM: 437.9  2017 - Present        Right sided weakness ICD-10-CM: M62.81  ICD-9-CM: 728.87  2017 - Present        * (Principal)Hemispheric carotid artery syndrome ICD-10-CM: G45.1  ICD-9-CM: 435.8  2017 - Present        CKD (chronic kidney disease) stage 3, GFR 30-59 ml/min ICD-10-CM: N18.3  ICD-9-CM: 585.3  2017 - Present        ARF (acute renal failure) ([facility] ICD-10-CM: N17.9  ICD-9-CM: 584.9  2016 - Present        Dizziness ICD-10-CM: R42  ICD-9-CM: 780.4  2016 - Present        LETY on CPAP (Chronic) ICD-10-CM: G47.33  ICD-9-CM: 327.23  2016 - Present        DM type 2 (diabetes mellitus, type 2) (HCC) (Chronic) ICD-10-CM: E11.9  ICD-9-CM: 250.00  2016 - Present        Vascular dementia (Chronic) ICD-10-CM: F01.50  ICD-9-CM: 290.40  2016 - Present        Hx-TIA (transient ischemic attack) (Chronic) ICD-10-CM: Z86.73  ICD-9-CM: V12.54  2016 - Present        Hyponatremia ICD-10-CM: E87.1  ICD-9-CM: 276.1  2016 - Present        Essential hypertension (Chronic) ICD-10-CM: I10  ICD-9-CM: 401.9  2013 - Present        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 ([facility] (Chronic) ICD-10-CM: K75.81, K74.60  ICD-9-CM: 571.8, 571.5  2013 - Present        Thrombocytopenia (HCC) (Chronic) ICD-10-CM: D69.6  ICD-9-CM: 287.5  2013 - Present        Depression (Chronic) ICD-10-CM: F32.9  ICD-9-CM: 549  2013 - Present        RESOLVED: Acute hepatic encephalopathy ([facility] ICD-10-CM: K72.00  ICD-9-CM: 572.2  2016 - 4/8/2016        RESOLVED: Dehydration, moderate ICD-10-CM: E86.0  ICD-9-CM: 276.51  1/16/2016 - 4/8/2016        RESOLVED: Lactic acidosis ICD-10-CM: Y43.2  ICD-9-CM: 276.2  1/16/2016 - 4/8/2016        RESOLVED: Metabolic encephalopathy SWG-76-KK: G93.41  ICD-9-CM: 348.31  8/19/2014 - 4/8/2016        RESOLVED: TIA (transient ischemic attack) ICD-10-CM: G45.9  ICD-9-CM: 435.9  12/12/2013 - 4/8/2016        RESOLVED: Slurred speech ICD-10-CM: R47.81  ICD-9-CM: 784.59  12/12/2013 - 4/8/2016        RESOLVED: Blurry vision, left eye ICD-10-CM: H53.8  ICD-9-CM: 368.8  12/12/2013 - 4/8/2016              DISCHARGE MEDICATIONS:         · It is important that you take the medication exactly as they are prescribed. · Keep your medication in the bottles provided by the pharmacist and keep a list of the medication names, dosages, and times to be taken in your wallet. · Do not take other medications without consulting your doctor. DIET:  Diabetic Diet    ACTIVITY: Activity as tolerated    ADDITIONAL INFORMATION: If you experience any of the following symptoms then please call your primary care physician or return to the emergency room if you cannot get hold of your doctor: Fever, chills, nausea, vomiting, diarrhea, change in mentation, falling, bleeding, shortness of breath. FOLLOW UP CARE:  Dr. Christal Ford MD  you are to call and set up an appointment to see them with in 1 week. Follow-up with specialists at directed by them      Information obtained by :  I understand that if any problems occur once I am at home I am to contact my physician. I understand and acknowledge receipt of the instructions indicated above.                                                                                                                                            Physician's or R.N.'s Signature                                                                  Date/Time Patient or Representative Signature                                                          Date/Time

## 2017-01-12 NOTE — CONSULTS
Fabi James MD, [facility] [phone]    Date of  Admission: 1/6/2017  4:40 PM         IMPRESSION and RECOMMENDATIONS     1. Bradycardia:  Sinus bradycardia in setting of propranolol. Apparently, hemodynamically stable and asymptomatic. Agree with discontinuing propranolol. Treatment for bradycardia would be PPM, but given lack of apparent symptoms, would not pursue PPM at this time. Echo unremarkable. Will check TSH. 2.  NSVT:  Noted on tele. No evidence of ischemia. Normal LVF. Typically, the treatment for this would be beta-blockers, but would not use given #1. Given normal LVF and DNR status, would not pursue further evaluation/Tx at this time. K ok. Check Mg. I have discussed this plan with the patient. He appears to understand this plan and wishes to proceed ahead.          Problem List  Date Reviewed: 1/12/2017          Codes Class Noted    Increased ammonia level ICD-10-CM: R79.89  ICD-9-CM: 790.6  1/9/2017        Cerebral microvascular disease ICD-10-CM: I67.9  ICD-9-CM: 437.9  1/9/2017        Right sided weakness ICD-10-CM: M62.81  ICD-9-CM: 728.87  1/9/2017        * (Principal)Hemispheric carotid artery syndrome ICD-10-CM: G45.1  ICD-9-CM: 435.8  1/7/2017        CKD (chronic kidney disease) stage 3, GFR 30-59 ml/min ICD-10-CM: N18.3  ICD-9-CM: 585.3  1/6/2017        ARF (acute renal failure) ([facility] ICD-10-CM: N17.9  ICD-9-CM: 584.9  4/8/2016        Dizziness ICD-10-CM: R42  ICD-9-CM: 780.4  4/8/2016        LETY on CPAP (Chronic) ICD-10-CM: G47.33  ICD-9-CM: 327.23  4/8/2016        DM type 2 (diabetes mellitus, type 2) (HCC) (Chronic) ICD-10-CM: E11.9  ICD-9-CM: 250.00  1/16/2016        Vascular dementia (Chronic) ICD-10-CM: F01.50  ICD-9-CM: 290.40  1/16/2016        Hx-TIA (transient ischemic attack) (Chronic) ICD-10-CM: U97.00  ICD-9-CM: V12.54  1/16/2016        Hyponatremia ICD-10-CM: E87.1  ICD-9-CM: 276.1  1/16/2016        Essential hypertension (Chronic) ICD-10-CM: I10  ICD-9-CM: 401.9  12/12/2013        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 male with the above problem list who was admitted for right side weakness;Right sided weakness. Pt presents with weakness. He was trying to fix a bowl of cereal and his right LE became weak. Then, while trying to get the milk, RUE became weak and shaky. Presented to hospital.  Sinus bradycardia noted on tele. Pt on propranolol at home. He denies chest pain/discomfort, shortness of breath, dyspnea on exertion, orthopnea, paroxysmal noctural dyspnea, palpitations, syncope, or near-syncope.     Current Facility-Administered Medications   Medication Dose Route Frequency    artificial tears (dextran 70-hypromellose) (NATURAL BALANCE) 0.1-0.3 % ophthalmic solution 2 Drop  2 Drop Both Eyes QID PRN    simvastatin (ZOCOR) tablet 40 mg  40 mg Oral QHS    aspirin delayed-release tablet 81 mg  81 mg Oral DAILY    escitalopram oxalate (LEXAPRO) tablet 20 mg  20 mg Oral QHS    lactulose (CHRONULAC) solution 20 g  20 g Oral TID    pantoprazole (PROTONIX) tablet 40 mg  40 mg Oral ACB    rifAXIMin (XIFAXAN) tablet 550 mg  550 mg Oral BID    spironolactone (ALDACTONE) tablet 25 mg  25 mg Oral DAILY    cyclobenzaprine (FLEXERIL) tablet 5 mg  5 mg Oral TID PRN    insulin lispro (HUMALOG) injection 20 Units  20 Units SubCUTAneous TIDAC    insulin glargine (LANTUS) injection 60 Units  60 Units SubCUTAneous DAILY    sodium chloride (NS) flush 5-10 mL  5-10 mL IntraVENous Q8H    sodium chloride (NS) flush 5-10 mL  5-10 mL IntraVENous PRN    glucose chewable tablet 16 g  4 Tab Oral PRN    dextrose (D50W) injection syrg 12.5-25 g  12.5-25 g IntraVENous PRN    glucagon (GLUCAGEN) injection 1 mg  1 mg IntraMUSCular PRN    insulin lispro (HUMALOG) injection   SubCUTAneous QID WITH MEALS      Allergies   Allergen Reactions    Codeine Shortness of Breath    Lipitor [Atorvastatin] Other (comments)     Stiff neck    Lisinopril Myalgia    Oxycodone Other (comments)     Cant walk cant breathe, need to use my c pap machine    Pcn [Penicillins] Shortness of Breath      Family History   Problem Relation Age of Onset    Hypertension Other     Diabetes Other       Social History     Social History    Marital status:      Spouse name: N/A    Number of children: N/A    Years of education: N/A     Occupational History    Not on file. Social History Main Topics    Smoking status: Never Smoker    Smokeless tobacco: Never Used    Alcohol use No    Drug use: No    Sexual activity: Not on file     Other Topics Concern    Not on file     Social History Narrative       Physical Exam:     Patient Vitals for the past 16 hrs:   BP Temp Pulse Resp SpO2   01/12/17 0700 - - (!) 57 - -   01/12/17 0522 111/54 98 °F (36.7 °C) (!) 52 16 99 %   01/12/17 0307 116/57 98.2 °F (36.8 °C) (!) 54 16 97 %   01/11/17 2302 - - (!) 53 - -   01/11/17 2137 137/63 98.5 °F (36.9 °C) (!) 53 16 98 %       HEENT Exam:     Normocephalic, atraumatic. EOMI. Oropharynx negative. Neck supple. No lymphadenopathy. Lung Exam:     The patient is not dyspneic. There is no cough. Breath sounds are heard equally in all lung fields. There are no wheezes, rales, rhonchi, or rubs heard on auscultation. Heart Exam:     The rhythm is regular. The PMI is in the 5th intercostal space of the MCL. Apical impulse is normal. S1 is regular. S2 is physiologic. There is no S3, S4 gallop, murmur, click, or rub. Abdomen Exam:     Bowel sounds are normoactive.  Abdomen soft in all quadrants. No tenderness. No palpable masses. No organomegaly. No hernias noted. No bruits or pulsatile mass. Extremities Exam:     The extremities are atraumatic appearing. There is no clubbing, cyanosis, ulcers, varicose veins, rash, erythemia noted in the extremities. The neurovascular status is grossly intact with normal distal sensation and pulses. Mild LE edema. Vascular Exam:     The radial, brachial, dorsalis pedis, posterior tibial, are equal and strong bilaterally The carotids are equal bilaterally without bruits. Labs:     Lab Results   Component Value Date/Time    Glucose 122 01/12/2017 04:21 AM    Sodium 138 01/12/2017 04:21 AM    Potassium 4.3 01/12/2017 04:21 AM    Chloride 108 01/12/2017 04:21 AM    CO2 24 01/12/2017 04:21 AM    BUN 21 01/12/2017 04:21 AM    Creatinine 1.45 01/12/2017 04:21 AM    Calcium 8.5 01/12/2017 04:21 AM     Recent Labs      01/12/17   0421  01/11/17   0701   WBC  7.1  7.0   HGB  11.7*  11.9*   HCT  34.9*  35.0*   PLT  78*  66*     Recent Labs      01/12/17   0421  01/11/17   0701   SGOT  31  39*   ALT  28  29   AP  66  63   TBILI  1.9*  1.5*   TP  6.4  6.2*   ALB  2.6*  2.4*   GLOB  3.8  3.8     No results for input(s): INR, PTP, APTT in the last 72 hours. No lab exists for component: INREXT   No results for input(s): CPK, CKMB, TNIPOC in the last 72 hours. No lab exists for component: TROPONINI, ITNL  No results for input(s): TROIQ in the last 72 hours. Lab Results   Component Value Date/Time    Cholesterol, total 136 01/07/2017 06:28 AM    HDL Cholesterol 59 01/07/2017 06:28 AM    LDL, calculated 60.4 01/07/2017 06:28 AM    Triglyceride 83 01/07/2017 06:28 AM    CHOL/HDL Ratio 2.3 01/07/2017 06:28 AM       EKG:  SB @ 39, old ASMI (n.b., no anterior WMA on echo). Echo (1/8/17):  SUMMARY:  Left ventricle: Systolic function was normal. Ejection fraction was  estimated in the range of 55 % to 60 %.  There were no regional wall motion  abnormalities. Left atrium: The atrium was mildly dilated.

## 2017-01-12 NOTE — INTERDISCIPLINARY ROUNDS
Interdisciplinary team rounds were held 1/12/2017 with the following team members:Care Management, Palliative, Nursing, Nutrition, Pharmacy, Physical Therapy, Clinical Coordinator and Unit Nurse Manager. Plan of care discussed. See clinical pathway and/or care plan for interventions and desired outcomes.     Anticipated discharge: today

## 2017-01-12 NOTE — PROGRESS NOTES
[facility] to [place] [address] [address].                                                                        73 y.o.   male    [address]   Room: 516/01    [facility] 5M1 MED SURG 1  Unit Phone# :  [phone]      [facility] 5M1 MED SURG 1  [address] [facility]  Dept: [phone]  Loc: [phone]                    SITUATION     Admitted:  1/6/2017         Attending Provider:  David Ann MD       Consultations:  IP CONSULT TO HOSPITALIST  IP CONSULT TO NEUROLOGY  IP CONSULT TO GASTROENTEROLOGY  IP CONSULT TO NEUROLOGY  IP CONSULT TO ORTHOPEDIC SURGERY  IP CONSULT TO CARDIOLOGY    PCP:  Jessee Herrera MD   [phone]    Treatment Team: Attending Provider: David Ann MD; Resident: Connor Key MD; Consulting Provider: Quinn Salcedo MD; Consulting Provider: Poonam Harding MD; Utilization Review: Esequiel Diggs RN; Consulting Provider: Vicky Porter MD; Care Manager: Ziggy Schmitt; Consulting Provider: Deidre Hunt MD; Consulting Provider: KAREN Rick; Consulting Provider: Hilda Cee MD    Admitting Dx:  right side weakness  Right sided weakness       Principal Problem: Hemispheric carotid artery syndrome    * No surgery found * of      BY: * Surgery not found *             ON: * No surgery found *                  Code Status: DNR                Advance Directives:   Advance Care Planning 1/6/2017   Patient's Healthcare Decision Maker is: Legal Next of Leah 71   Primary Decision Maker Name Home [place]   Primary Decision Maker Phone Number [phone]   Primary Decision Maker Relationship to Patient Spouse   Confirm Advance Directive Yes, not on file   Does the patient have other document types Do Not Resuscitate; Power of     (Send w/patient)   Yes Not W Pt       Isolation:  There are currently no Active Isolations       MDRO: No current active infections    Pain Medications given:  Flexeril 5 MG Last dose: 1/12/2017  at  1244    Special Equipment needed: Yes  Type of equipment: Rolling Walker       (Not currently on dialysis)  (Not currently on dialysis)  (Not currently on dialysis)     BACKGROUND     Allergies: Allergies   Allergen Reactions    Codeine Shortness of Breath    Lipitor [Atorvastatin] Other (comments)     Stiff neck    Lisinopril Myalgia    Oxycodone Other (comments)     Cant walk cant breathe, need to use my c pap machine    Pcn [Penicillins] Shortness of Breath       Past Medical History   Diagnosis Date    Anxiety     Arthritis     Cerebral microvascular disease 1/9/2017    Depression     Diabetes mellitus ([facility]     Hypertension     Increased ammonia level 1/9/2017    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  propranolol (INDERAL) 40 mg tablet Take 40 mg by mouth two (2) times a day.  magnesium oxide (MAG-OX) 400 mg tablet Take 1 Tab by mouth two (2) times a day.  rifaximin (XIFAXAN) 550 mg tablet Take 1 Tab by mouth two (2) times a day.  spironolactone (ALDACTONE) 25 mg tablet Take 25 mg by mouth daily.  cyclobenzaprine (FLEXERIL) 10 mg tablet Take 5-10 mg by mouth three (3) times daily as needed for Muscle Spasm(s).  lactulose (CHRONULAC) 10 gram/15 mL solution Take 20 g by mouth three (3) times daily.  omeprazole (PRILOSEC) 40 mg capsule Take 40 mg by mouth daily.  escitalopram (LEXAPRO) 20 mg tablet Take 20 mg by mouth nightly.     albuterol (VENTOLIN HFA) 90 mcg/actuation inhaler Take 1 Puff by inhalation every four (4) hours as needed for Wheezing or Shortness of Breath. Hard scripts included in transfer packet yes    Vaccinations: There is no immunization history for the selected administration types on file for this patient. Readmission Risks:    Known Risks: Falls        The [employer] Index tool is an evidenced based tool that has more automatic generated information. The tool looks at many different items such as the age of the patient, how many times they were admitted in the last calendar year, current length of stay in the hospital and their diagnosis. All of these items are pulled automatically from information documented in the chart from various places and will generate a score that predicts whether a patient is at low (less than 13), medium (13-20) or high (21 or greater) risk of being readmitted.         ASSESSMENT                Temp: 98 °F (36.7 °C) (01/12/17 1347) Pulse (Heart Rate): (!) 53 (01/12/17 1347)     Resp Rate: 16 (01/12/17 1347)           BP: 109/54 (01/12/17 1347)     O2 Sat (%): 96 % (01/12/17 1347)     Weight: 107 kg (236 lb)    Height: 5' 9.25\" (175.9 cm) (01/12/17 1204)       If above not within 1 hour of discharge:    BP:_____  P:____  R:____ T:_____ O2 Sat: ___%  O2: ______    Active Orders   Diet    DIET CARDIAC Regular; Consistent Carb 2000kcal         Orientation: oriented to time, place, person and situation     Active Behaviors: None                                   Active Lines/Drains:  (Peg Tube / Herrera / CL or S/L?): no    Urinary Status: Voiding     Last BM: Last Bowel Movement Date: 01/11/17     Skin Integrity: Intact             Mobility: Slightly limited   Weight Bearing Status: WBAT (Weight Bearing as Tolerated)      Gait Training  Assistive Device: Gait belt, Walker, rolling  Ambulation - Level of Assistance: Minimal assistance  Distance (ft): 80 Feet (ft)         Lab Results   Component Value Date/Time    Glucose 122 01/12/2017 04:21 AM    Hemoglobin A1c 7.8 01/07/2017 06:28 AM    INR 1.3 04/08/2016 11:40 AM    INR 1.3 01/16/2016 01:05 AM    HGB 11.7 01/12/2017 04:21 AM    HGB 11.9 01/11/2017 07:01 AM        RECOMMENDATION     See After Visit Summary (AVS) for:  · Discharge instructions  · After [address]   · Special equipment needed (entered pre-discharge by Care Management)  · Medication Reconciliation    · Follow up Appointment(s)         Report given/sent by:  Kishore Crawley RN                    Verbal report given to: Loc Ryan RN           Estimated discharge time:  1/12/2017 at 1430          I have reviewed discharge instructions with the patient and spouse. The patient and spouse verbalized understanding. Patients spouse transporting patent to facility.

## 2017-01-12 NOTE — PROGRESS NOTES
[facility]. [address]  [phone]              GI PROGRESS NOTE    NAME: Se Buitrago :  1945   MRN:  [id number]     CC:f/u HE    Subjective:   Was resting with sheet over head. Oriented to person and place. Denies complaints except dry eye. Objective:     VITALS:   Last 24hrs VS reviewed since prior progress note. Most recent are:  Visit Vitals    /54 (BP 1 Location: Left arm, BP Patient Position: At rest)    Pulse (!) 57    Temp 98 °F (36.7 °C)    Resp 16    Ht 5' 9.25\" (1.759 m)    Wt 107 kg (236 lb)    SpO2 99%    BMI 34.6 kg/m2       Intake & Output     01/10 1901 -  0700  In: -   Out: 580 [Urine:580]    ROS: Neg for fever, chest pain, SOB. PHYSICAL EXAM:  General: Cooperative, no acute distress, +spider angiomas to chest   Neurologic:  Alert and oriented X 3. No asterixis. Speech is slow. HEENT: PERRLA, EOMI. Lungs:  CTA Bilaterally. No Wheezing  Heart:  s1 s2, Regular  rhythm,  No murmur   Abdomen: Soft, Non distended, Non tender. Bowel sounds normal. No guarding or rebound. No hepatosplenomegaly. Extremities: No edema  Psych:   Good insight. Not anxious nor agitated. Lab Data Reviewed:   Recent Labs      17   0421  17   0701  01/10/17   0441   WBC  7.1  7.0  5.9   HGB  11.7*  11.9*  11.7*   MCV  94.8  97.2  96.7   PLT  78*  66*  72*   NA  138  140  142   K  4.3  4.7  3.9   CREA  1.45*  1.30  1.47*   BUN  21*  17  18   ALB  2.6*  2.4*  2.5*   TBILI  1.9*  1.5*  1.9*   SGOT  31  39*  38*   ALT  28  29  29   AP  66  63  53         ________________________________________________________________________       Assessment/Plan:   ALFARO Cirrhosis with hepatic encephalopathy, improved on lactulose and rifaximin. Unclear precipitating cause but ? May be non compliance at home. Continue supportive treatment.      Right sided weakness with h/o TIA- MRI ok, ECHO ok, now on ASA, neuro seen    Signed By: Roberto Schafer NP     1/12/2017  10:10 AM
